# Patient Record
Sex: FEMALE | Race: WHITE | Employment: FULL TIME | URBAN - METROPOLITAN AREA
[De-identification: names, ages, dates, MRNs, and addresses within clinical notes are randomized per-mention and may not be internally consistent; named-entity substitution may affect disease eponyms.]

---

## 2019-06-04 ENCOUNTER — HOSPITAL ENCOUNTER (OUTPATIENT)
Dept: MAMMOGRAPHY | Age: 59
Discharge: HOME | End: 2019-06-04
Attending: OBSTETRICS & GYNECOLOGY
Payer: COMMERCIAL

## 2019-06-04 DIAGNOSIS — Z12.31 ENCOUNTER FOR SCREENING MAMMOGRAM FOR MALIGNANT NEOPLASM OF BREAST: ICD-10-CM

## 2019-06-04 PROCEDURE — 77067 SCR MAMMO BI INCL CAD: CPT | Performed by: OBSTETRICS & GYNECOLOGY

## 2020-12-13 ENCOUNTER — HOSPITAL ENCOUNTER (OUTPATIENT)
Dept: MAMMOGRAPHY | Age: 60
Discharge: HOME | End: 2020-12-13
Attending: OBSTETRICS & GYNECOLOGY
Payer: COMMERCIAL

## 2020-12-13 DIAGNOSIS — Z12.31 ENCOUNTER FOR SCREENING MAMMOGRAM FOR MALIGNANT NEOPLASM OF BREAST: ICD-10-CM

## 2020-12-13 PROCEDURE — 77063 BREAST TOMOSYNTHESIS BI: CPT | Performed by: OBSTETRICS & GYNECOLOGY

## 2020-12-13 PROCEDURE — 77067 SCR MAMMO BI INCL CAD: CPT | Performed by: OBSTETRICS & GYNECOLOGY

## 2021-08-31 PROCEDURE — 88342 IMHCHEM/IMCYTCHM 1ST ANTB: CPT | Performed by: OTOLARYNGOLOGY

## 2021-08-31 PROCEDURE — 88305 TISSUE EXAM BY PATHOLOGIST: CPT | Performed by: OTOLARYNGOLOGY

## 2021-08-31 PROCEDURE — 88341 IMHCHEM/IMCYTCHM EA ADD ANTB: CPT | Performed by: OTOLARYNGOLOGY

## 2021-10-29 ENCOUNTER — HOSPITAL ENCOUNTER (INPATIENT)
Facility: HOSPITAL | Age: 61
LOS: 2 days | Discharge: HOME | DRG: 281 | End: 2021-11-01
Attending: EMERGENCY MEDICINE | Admitting: HOSPITALIST
Payer: COMMERCIAL

## 2021-10-29 ENCOUNTER — APPOINTMENT (OUTPATIENT)
Dept: GENERAL RADIOLOGY | Age: 61
DRG: 281 | End: 2021-10-29
Attending: EMERGENCY MEDICINE
Payer: COMMERCIAL

## 2021-10-29 DIAGNOSIS — I20.0 UNSTABLE ANGINA (HCC): ICD-10-CM

## 2021-10-29 DIAGNOSIS — I21.4 NSTEMI (NON-ST ELEVATED MYOCARDIAL INFARCTION) (HCC): ICD-10-CM

## 2021-10-29 DIAGNOSIS — R79.89 AZOTEMIA: ICD-10-CM

## 2021-10-29 DIAGNOSIS — D72.829 LEUKOCYTOSIS, UNSPECIFIED TYPE: ICD-10-CM

## 2021-10-29 DIAGNOSIS — R77.8 ELEVATED TROPONIN: Primary | ICD-10-CM

## 2021-10-29 PROCEDURE — 71045 X-RAY EXAM CHEST 1 VIEW: CPT | Performed by: EMERGENCY MEDICINE

## 2021-10-29 RX ORDER — NITROGLYCERIN 20 MG/100ML
INJECTION INTRAVENOUS
Status: DISCONTINUED | OUTPATIENT
Start: 2021-10-29 | End: 2021-11-01

## 2021-10-29 RX ORDER — LIDOCAINE HYDROCHLORIDE 20 MG/ML
10 SOLUTION OROPHARYNGEAL ONCE
Status: COMPLETED | OUTPATIENT
Start: 2021-10-29 | End: 2021-10-29

## 2021-10-29 RX ORDER — MORPHINE SULFATE 4 MG/ML
2 INJECTION, SOLUTION INTRAMUSCULAR; INTRAVENOUS ONCE
Status: COMPLETED | OUTPATIENT
Start: 2021-10-29 | End: 2021-10-29

## 2021-10-29 RX ORDER — ASPIRIN 81 MG/1
324 TABLET, CHEWABLE ORAL ONCE
Status: DISCONTINUED | OUTPATIENT
Start: 2021-10-29 | End: 2021-11-01

## 2021-10-29 RX ORDER — HEPARIN SODIUM AND DEXTROSE 10000; 5 [USP'U]/100ML; G/100ML
12 INJECTION INTRAVENOUS ONCE
Status: COMPLETED | OUTPATIENT
Start: 2021-10-29 | End: 2021-10-30

## 2021-10-29 RX ORDER — MORPHINE SULFATE 4 MG/ML
2 INJECTION, SOLUTION INTRAMUSCULAR; INTRAVENOUS EVERY 30 MIN PRN
Status: DISCONTINUED | OUTPATIENT
Start: 2021-10-29 | End: 2021-11-01

## 2021-10-29 RX ORDER — HEPARIN SODIUM 5000 [USP'U]/ML
60 INJECTION INTRAVENOUS; SUBCUTANEOUS ONCE
Status: COMPLETED | OUTPATIENT
Start: 2021-10-29 | End: 2021-10-29

## 2021-10-29 RX ORDER — NITROGLYCERIN 0.4 MG/1
0.4 TABLET SUBLINGUAL ONCE
Status: COMPLETED | OUTPATIENT
Start: 2021-10-29 | End: 2021-10-29

## 2021-10-29 RX ORDER — MAGNESIUM HYDROXIDE/ALUMINUM HYDROXICE/SIMETHICONE 120; 1200; 1200 MG/30ML; MG/30ML; MG/30ML
30 SUSPENSION ORAL ONCE
Status: COMPLETED | OUTPATIENT
Start: 2021-10-29 | End: 2021-10-29

## 2021-10-29 RX ORDER — MORPHINE SULFATE 4 MG/ML
4 INJECTION, SOLUTION INTRAMUSCULAR; INTRAVENOUS EVERY 30 MIN PRN
Status: DISCONTINUED | OUTPATIENT
Start: 2021-10-29 | End: 2021-11-01

## 2021-10-30 ENCOUNTER — APPOINTMENT (OUTPATIENT)
Dept: CV DIAGNOSTICS | Facility: HOSPITAL | Age: 61
DRG: 281 | End: 2021-10-30
Attending: INTERNAL MEDICINE
Payer: COMMERCIAL

## 2021-10-30 ENCOUNTER — APPOINTMENT (OUTPATIENT)
Dept: INTERVENTIONAL RADIOLOGY/VASCULAR | Facility: HOSPITAL | Age: 61
DRG: 281 | End: 2021-10-30
Attending: INTERNAL MEDICINE
Payer: COMMERCIAL

## 2021-10-30 PROBLEM — I20.0 UNSTABLE ANGINA (HCC): Status: ACTIVE | Noted: 2021-10-30

## 2021-10-30 PROBLEM — D72.829 LEUKOCYTOSIS, UNSPECIFIED TYPE: Status: ACTIVE | Noted: 2021-10-30

## 2021-10-30 PROBLEM — I21.4 NSTEMI (NON-ST ELEVATED MYOCARDIAL INFARCTION) (HCC): Status: ACTIVE | Noted: 2021-10-30

## 2021-10-30 PROBLEM — R79.89 AZOTEMIA: Status: ACTIVE | Noted: 2021-10-30

## 2021-10-30 PROCEDURE — 93306 TTE W/DOPPLER COMPLETE: CPT | Performed by: INTERNAL MEDICINE

## 2021-10-30 PROCEDURE — 99223 1ST HOSP IP/OBS HIGH 75: CPT | Performed by: HOSPITALIST

## 2021-10-30 PROCEDURE — B2111ZZ FLUOROSCOPY OF MULTIPLE CORONARY ARTERIES USING LOW OSMOLAR CONTRAST: ICD-10-PCS | Performed by: INTERNAL MEDICINE

## 2021-10-30 PROCEDURE — B2151ZZ FLUOROSCOPY OF LEFT HEART USING LOW OSMOLAR CONTRAST: ICD-10-PCS | Performed by: INTERNAL MEDICINE

## 2021-10-30 RX ORDER — ASPIRIN 325 MG
325 TABLET, DELAYED RELEASE (ENTERIC COATED) ORAL ONCE
Status: COMPLETED | OUTPATIENT
Start: 2021-10-30 | End: 2021-10-30

## 2021-10-30 RX ORDER — ZINC SULFATE 50(220)MG
50 CAPSULE ORAL DAILY
COMMUNITY

## 2021-10-30 RX ORDER — LIDOCAINE HYDROCHLORIDE 10 MG/ML
INJECTION, SOLUTION EPIDURAL; INFILTRATION; INTRACAUDAL; PERINEURAL
Status: COMPLETED
Start: 2021-10-30 | End: 2021-10-30

## 2021-10-30 RX ORDER — TRAMADOL HYDROCHLORIDE 50 MG/1
50 TABLET ORAL EVERY 6 HOURS PRN
Status: DISCONTINUED | OUTPATIENT
Start: 2021-10-30 | End: 2021-11-01

## 2021-10-30 RX ORDER — SODIUM CHLORIDE 9 MG/ML
INJECTION, SOLUTION INTRAVENOUS CONTINUOUS
Status: ACTIVE | OUTPATIENT
Start: 2021-10-30 | End: 2021-10-30

## 2021-10-30 RX ORDER — ONDANSETRON 2 MG/ML
4 INJECTION INTRAMUSCULAR; INTRAVENOUS EVERY 6 HOURS PRN
Status: DISCONTINUED | OUTPATIENT
Start: 2021-10-30 | End: 2021-11-01

## 2021-10-30 RX ORDER — ASCORBIC ACID 500 MG
500 TABLET ORAL DAILY
COMMUNITY

## 2021-10-30 RX ORDER — MIDAZOLAM HYDROCHLORIDE 1 MG/ML
INJECTION INTRAMUSCULAR; INTRAVENOUS
Status: DISCONTINUED
Start: 2021-10-30 | End: 2021-10-30 | Stop reason: WASHOUT

## 2021-10-30 RX ORDER — MIDAZOLAM HYDROCHLORIDE 1 MG/ML
INJECTION INTRAMUSCULAR; INTRAVENOUS
Status: COMPLETED
Start: 2021-10-30 | End: 2021-10-30

## 2021-10-30 RX ORDER — ONDANSETRON 2 MG/ML
4 INJECTION INTRAMUSCULAR; INTRAVENOUS ONCE
Status: DISCONTINUED | OUTPATIENT
Start: 2021-10-30 | End: 2021-11-01

## 2021-10-30 RX ORDER — HEPARIN SODIUM AND DEXTROSE 10000; 5 [USP'U]/100ML; G/100ML
INJECTION INTRAVENOUS CONTINUOUS
Status: DISCONTINUED | OUTPATIENT
Start: 2021-10-30 | End: 2021-10-31 | Stop reason: ALTCHOICE

## 2021-10-30 RX ORDER — PROCHLORPERAZINE EDISYLATE 5 MG/ML
5 INJECTION INTRAMUSCULAR; INTRAVENOUS EVERY 8 HOURS PRN
Status: DISCONTINUED | OUTPATIENT
Start: 2021-10-30 | End: 2021-11-01

## 2021-10-30 RX ORDER — ATORVASTATIN CALCIUM 40 MG/1
40 TABLET, FILM COATED ORAL NIGHTLY
Status: DISCONTINUED | OUTPATIENT
Start: 2021-10-30 | End: 2021-11-01

## 2021-10-30 RX ORDER — ACETAMINOPHEN 325 MG/1
650 TABLET ORAL EVERY 6 HOURS PRN
Status: DISCONTINUED | OUTPATIENT
Start: 2021-10-30 | End: 2021-11-01

## 2021-10-30 RX ORDER — TRAMADOL HYDROCHLORIDE 50 MG/1
100 TABLET ORAL EVERY 6 HOURS PRN
Status: DISCONTINUED | OUTPATIENT
Start: 2021-10-30 | End: 2021-11-01

## 2021-10-30 RX ORDER — LISINOPRIL 5 MG/1
5 TABLET ORAL DAILY
Status: DISCONTINUED | OUTPATIENT
Start: 2021-10-30 | End: 2021-10-31

## 2021-10-30 RX ORDER — SODIUM CHLORIDE 9 MG/ML
INJECTION, SOLUTION INTRAVENOUS CONTINUOUS
Status: DISCONTINUED | OUTPATIENT
Start: 2021-10-30 | End: 2021-10-30

## 2021-10-30 RX ORDER — HEPARIN SODIUM 5000 [USP'U]/ML
INJECTION, SOLUTION INTRAVENOUS; SUBCUTANEOUS
Status: COMPLETED
Start: 2021-10-30 | End: 2021-10-30

## 2021-10-30 RX ORDER — ACETAMINOPHEN 325 MG/1
650 TABLET ORAL EVERY 4 HOURS PRN
Status: DISCONTINUED | OUTPATIENT
Start: 2021-10-30 | End: 2021-11-01

## 2021-10-30 RX ORDER — MORPHINE SULFATE 2 MG/ML
2 INJECTION, SOLUTION INTRAMUSCULAR; INTRAVENOUS ONCE
Status: COMPLETED | OUTPATIENT
Start: 2021-10-30 | End: 2021-10-30

## 2021-10-30 RX ORDER — SODIUM CHLORIDE 9 MG/ML
INJECTION, SOLUTION INTRAVENOUS
Status: DISCONTINUED | OUTPATIENT
Start: 2021-10-31 | End: 2021-10-30 | Stop reason: HOSPADM

## 2021-10-30 NOTE — PLAN OF CARE
Admitted pt from [address] [place] [address] [facility]/o mid chest pain 2/10 which is worse than before and L sided back pain 4/10 which pt reported that she developed while at [place]. Dr. Renetta Cerrato updated and notified of pt admission.  Dr. Chad Jones called to notify pt co,

## 2021-10-30 NOTE — PLAN OF CARE
Received patient at07:30 awake and alert. Chest discomfort 2 on pain scale. Heparin and Ntg infusing per protocol. EKG completed with Dr. Amber Harper at bedside. Orders for pre cath orders. Bilateral groins shaved and consent obtained.    Received patient f

## 2021-10-30 NOTE — PROCEDURES
Full procedure was dictated.     Procedure performed:   -Selective coronary angiography  -LV gram    Procedure indications:  -Acute non-ST elevation myocardial infarction  -Ongoing chest pain   -elevated blood pressure with no history of hypertension  -Fami 2021

## 2021-10-30 NOTE — ED PROVIDER NOTES
Patient Seen in: [facility] Emergency Department In [place]      History   Patient presents with:  Chest Pain Angina    Stated Complaint: chest pain, nausea onset 5pm    Subjective:   HPI    [address] is a pleasant 29-year-old female presented with chest pain appears no distress HEENT exam normal lungs normal cardiovascular exam normal abdomen normal extremities cyanosis or edema skin is nondiaphoretic patient is moving all extremities on neurologic exam       ED Course     Labs Reviewed   COMP METABOLIC PANEL with the patient, family, and clinical staff. MDM      Patient already received aspirin at home was given 2 rounds of nitroglycerin pills here in the emergency department sublingually with improvement minimal but still improvement of her pain.   P

## 2021-10-30 NOTE — H&P
[facility] HOSPITALIST  History and Physical     Buddy Goode Patient Status:  Inpatient    1960 MRN [id number]   [facility] 6NE-A Attending Delaney Mcconnell MD   Hosp Day # 0 PCP Jace Vargas MD     Chief Complaint: Chest pain    His UNITS Oral Tab, Take 1 tablet by mouth daily. , Disp: , Rfl:   B Complex Vitamins (VITAMIN B COMPLEX OR), Inject  as directed., Disp: , Rfl:   Nutritional Supplements (ESTROVEN) Oral Tab, Take  by mouth., Disp: , Rfl:         Review of Systems:   A comprehe results for input(s): CK in the last 168 hours. Inflammatory Markers  No results for input(s): CRP, ZIYAD, LDH, DDIMER in the last 168 hours. Imaging: Imaging data reviewed in Epic.       ASSESSMENT / PLAN:     #NSTEMI  -trend troponin  -cont nitroglyce

## 2021-10-31 PROCEDURE — 99232 SBSQ HOSP IP/OBS MODERATE 35: CPT | Performed by: HOSPITALIST

## 2021-10-31 RX ORDER — POTASSIUM CHLORIDE 20 MEQ/1
40 TABLET, EXTENDED RELEASE ORAL ONCE
Status: COMPLETED | OUTPATIENT
Start: 2021-10-31 | End: 2021-10-31

## 2021-10-31 RX ORDER — CARVEDILOL 6.25 MG/1
6.25 TABLET ORAL 2 TIMES DAILY WITH MEALS
Status: DISCONTINUED | OUTPATIENT
Start: 2021-10-31 | End: 2021-11-01

## 2021-10-31 RX ORDER — LISINOPRIL 2.5 MG/1
2.5 TABLET ORAL DAILY
Status: DISCONTINUED | OUTPATIENT
Start: 2021-10-31 | End: 2021-11-01

## 2021-10-31 NOTE — PROGRESS NOTES
[facility]     Hospitalist Progress Note     Carmencita Anthony Patient Status:  Inpatient    1960 MRN [id number]   [facility] 6NE-A Attending Barrett Sunshine MD   Hosp Day # 1 PCP Lawrence Orlando MD     Chief Complaint: Chest pain Epic.    Medications:   • lisinopril  2.5 mg Oral Daily   • carvedilol  6.25 mg Oral BID with meals   • rivaroxaban  15 mg Oral Daily with food   • atorvastatin  40 mg Oral Nightly   • ondansetron  4 mg Intravenous Once   • aspirin  324 mg Oral Once

## 2021-10-31 NOTE — PLAN OF CARE
Pt ambulated to bathroom around 8 pm last night\. Got fatigued and nauseous. 4 mg Zofran Iv given per pt request with relief and fell asleep. Hypotensive while sleeping, BP comes up when wakes up. Metoprolol held this am. Denies chest pain or SOB.  Will con

## 2021-10-31 NOTE — PLAN OF CARE
Received patient at 07:30 awake and alert. Complaints of stiffness in back. Up to chair/bathroom. Right groin site soft, no hematoma dressing removed. Continue plan of care. 1530 up ambulated hallway without difficulty.     Problem: CARDIOVASCULAR - A

## 2021-10-31 NOTE — PROGRESS NOTES
[facility]  Progress Note    Norberto Wsieman Patient Status:  Inpatient    1960 MRN [id number]   [facility] 6NE-A Attending Keerthi Arriola MD   Hosp Day # 1 PCP Philly Rodriguez MD     Assessment:  #Takotsubo cardiomyopathy with EF

## 2021-11-01 VITALS
OXYGEN SATURATION: 100 % | BODY MASS INDEX: 27.06 KG/M2 | DIASTOLIC BLOOD PRESSURE: 60 MMHG | RESPIRATION RATE: 16 BRPM | WEIGHT: 172.38 LBS | SYSTOLIC BLOOD PRESSURE: 121 MMHG | HEIGHT: 67 IN | TEMPERATURE: 99 F | HEART RATE: 77 BPM

## 2021-11-01 PROCEDURE — 99239 HOSP IP/OBS DSCHRG MGMT >30: CPT | Performed by: HOSPITALIST

## 2021-11-01 RX ORDER — ATORVASTATIN CALCIUM 20 MG/1
20 TABLET, FILM COATED ORAL NIGHTLY
Qty: 30 TABLET | Refills: 11 | Status: SHIPPED | OUTPATIENT
Start: 2021-11-01

## 2021-11-01 RX ORDER — LISINOPRIL 2.5 MG/1
2.5 TABLET ORAL DAILY
Qty: 30 TABLET | Refills: 11 | Status: SHIPPED | OUTPATIENT
Start: 2021-11-01

## 2021-11-01 RX ORDER — CARVEDILOL 6.25 MG/1
6.25 TABLET ORAL 2 TIMES DAILY WITH MEALS
Qty: 60 TABLET | Refills: 0 | Status: SHIPPED | OUTPATIENT
Start: 2021-11-01

## 2021-11-01 RX ORDER — ATORVASTATIN CALCIUM 20 MG/1
20 TABLET, FILM COATED ORAL NIGHTLY
Status: DISCONTINUED | OUTPATIENT
Start: 2021-11-01 | End: 2021-11-01

## 2021-11-01 NOTE — PLAN OF CARE
Assumed care of pt around 1930. Denies pain. SR-ST. HR 90's-low 100's. SBP low 100's. Pt refusing 2100 lisinopril because her SBP drops while she sleeps. VSS on RA. R groin C/D/I, soft, no hematoma. Walking the halls. Transfer orders in, awaiting bed.  Plan

## 2021-11-01 NOTE — PROGRESS NOTES
[facility]     Cardiology Progress Note        Francisco Guerra Patient Status:  Inpatient    1960 MRN [id number]   [facility] 6NE-A Attending Bruce Bloom MD   Hosp Day # 2 PCP Jaylene Villatoro MD       Subjective:  No chest further with dr Frye Records    Discussed plan of care with patient and nursing. GARTH Rodgers  11/1/2021  10:34 AM    I agree with above note and plan. I seen and examined the patient. The chart was reviewed.  at the bedside.     Patient affect BP. No need for diuretic. Xarelto 15 mg p.o. daily as initiated by Dr. Miguel Prabhakar with akinetic apex.   Most likely Xarelto will be discontinued in 6 to 8 weeks after echo is completed if there is improvement in LV systolic function and wall motion ab

## 2021-11-01 NOTE — PAYOR COMM NOTE
--------------  ADMISSION REVIEW     Payor: [address] [place]  Subscriber #:  [id number]  Authorization Number: [id number]     Admit date: 10/30/21  Admit time:  1:08 AM     10/29 Patient Seen in: [facility] Emergency Department In [facility] Result Value    Glucose 163 (*)     BUN 20 (*)     All other components within normal limits   TROPONIN I - Abnormal; Notable for the following components:    Troponin 1.120 (*)     All other components within normal limits   CBC W/ DIFFERENTIAL - Ab 145/84   Pulse 88   Temp 98 °F (36.7 °C) (Temporal)   Resp 18   Wt 167 lb 8.8 oz (76 kg)   SpO2 96%   BMI 27.88 kg/m²     Lab 10/29/21  2051   WBC 16.0*   HGB 14.1   MCV 91.9   .0     *   BUN 20*   CREATSERUM 0.77   GFRAA 97   GFRNAA 84   CA father).     Impression:  1. Non-STEMI. Troponin 1.12 in ER. 2.  Chest pain. 3.  Elevated blood pressure, no history of hypertension. 4.  Hyperglycemia. 5.  Family history positive for early CAD and thoracic aorta aneurysm (father).   6.  Spine arthri 1.7, then DC troponin tomorrow morning  -Start metoprolol tartrate 25 mg p.o. twice a day   -We will change to long-acting beta-blocker before discharge if short acting beta-blocker is tolerated since her blood pressure fluctuates  -Start lisinopril 5 mg p (last day)     Date/Time Temp Pulse Resp BP SpO2 Weight O2 Device O2 Flow Rate (L/min) [facility]    11/01/21 0800 — 80 15 129/70 100 % — None (Room air) — [place]    11/01/21 0700 — 63 14 122/61 98 % — — — [place]    11/01/21 0600 — 117 25 126/68 97 % — — — [place]    11/01/21 91/53 93 % — — —     10/31/21 0100 — 67 19 79/42 93 % — — —     10/31/21 0015 97.3 °F (36.3 °C) 75 19 92/54 94 % — — —     10/31/21 0006 — 89 27 87/53 94 % — — —     10/31/21 0003 — 78 11 73/43 95 % — — —     10/31/21 0000 — 73 11 70/41 93 % — —

## 2021-11-01 NOTE — PROGRESS NOTES
[facility]     Hospitalist Progress Note     Merle Shan Patient Status:  Inpatient    1960 MRN [id number]   [facility] 6NE-A Attending Lm Sousa MD   Hosp Day # 2 PCP Kathe Dunn MD     Chief Complaint: Chest pain results for input(s): CRP, ZIYAD, LDH, DDIMER in the last 168 hours. Imaging: Imaging data reviewed in Epic.     Medications:   • lisinopril  2.5 mg Oral Daily   • carvedilol  6.25 mg Oral BID with meals   • rivaroxaban  15 mg Oral Daily with food   • ator

## 2021-11-01 NOTE — PROCEDURES
[facility]    PATIENT'S NAME: [place], [place]   ATTENDING PHYSICIAN: Arabella Joe M.D. OPERATING PHYSICIAN: Echo Sandoval M.D.    PATIENT ACCOUNT#:   [de-identified]    LOCATION:  [address] [place] [address]  MEDICAL RECORD #:   [id number]       DATE OF BIRTH:  1 performed using 6-Turkish pigtail catheter. We measured pullback pressures across the aortic valve and pigtail catheter was removed.     Right femoral arterial sheathogram was performed and was acceptable for percutaneous closure using 6-Turkish Angio-Seal c valve.    PLAN:    1.   CCU monitoring. 2.   Medical management of stress cardiomyopathy. 3.   See postcardiac cath orders.   4.   Start metoprolol tartrate 25 mg p.o. twice a day and will change to long-acting beta-blocker before discharge if short-acti

## 2021-11-01 NOTE — PLAN OF CARE
Reviewed discharge instructions with follow up with patient.     Problem: CARDIOVASCULAR - ADULT  Goal: Maintains optimal cardiac output and hemodynamic stability  Description: INTERVENTIONS:  - Monitor vital signs, rhythm, and trends  - Monitor for bleedin

## 2021-11-02 NOTE — DISCHARGE SUMMARY
[facility] HOSPITALIST  DISCHARGE SUMMARY     Cristina Arciniega Patient Status:  Inpatient    1960 MRN [id number]   [facility] 6NE-A Attending No att. providers found   Hosp Day # 2 PCP Sj Brown MD     Date of Admission: 10/29/2021  Peter Quantity: 30 tablet  Refills: 11     carvedilol 6.25 MG Tabs  Commonly known as: COREG      Take 1 tablet (6.25 mg total) by mouth 2 (two) times daily with meals.    Quantity: 60 tablet  Refills: 0     lisinopril 2.5 MG Tabs      Take 1 tablet (2.5 mg total with Insurance for coverage                                      First visit 1 1/2 hours                                                                   Dress comfortably

## 2021-11-02 NOTE — PAYOR COMM NOTE
--------------  DISCHARGE REVIEW    Payor: [address] [facility]  Subscriber #:  [id number]  Authorization Number: [id number]     Admit date: 10/30/21  Admit time:   1:08 AM  Discharge Date: 11/1/2021  4:16 PM     Admitting Physician: Cathy Trevino MD discharged home in good condition    Lace+ Score: 43  59-90 High Risk  29-58 Medium Risk  0-28   Low Risk         TCM Follow-Up Recommendation:  LACE > 58:  High Risk of readmission after discharge from the hospital.    Procedures during hospitalization: Carlin London [phone], [facility] [address], [address] [facility]    Phone: [phone]   · atorvastatin 20 MG Tabs  · carvedilol 6.25 MG Tabs  · lisinopril 2.5 MG Tabs  · rivaroxaban 15 MG Tabs         [place]: [place]    Follow-up appoi extremities. Extremities: No edema.   -----------------------------------------------------------------------------------------------  PATIENT DISCHARGE INSTRUCTIONS: See electronic chart    Sigifredo Barrett MD    Time spent:  > 30 minutes    Electronically

## 2021-11-05 ENCOUNTER — TELEPHONE (OUTPATIENT)
Dept: OTHER | Facility: HOSPITAL | Age: 61
End: 2021-11-05

## 2021-11-05 NOTE — PROGRESS NOTES
[facility] Follow up Call  1. How are you doing now that you're home? Patient reports that she is doing well, getting stronger and feeling better each day. She denies any pain, bleeding or infection to the procedure site.     2. Since leaving the hospital, have

## 2021-11-15 ENCOUNTER — ORDER TRANSCRIPTION (OUTPATIENT)
Dept: CARDIAC REHAB | Facility: HOSPITAL | Age: 61
End: 2021-11-15

## 2021-11-15 DIAGNOSIS — I21.4 NSTEMI (NON-ST ELEVATED MYOCARDIAL INFARCTION) (HCC): Primary | ICD-10-CM

## 2021-11-16 ENCOUNTER — CARDPULM VISIT (OUTPATIENT)
Dept: CARDIAC REHAB | Facility: HOSPITAL | Age: 61
End: 2021-11-16
Attending: INTERNAL MEDICINE
Payer: COMMERCIAL

## 2021-11-16 VITALS — HEIGHT: 66 IN | WEIGHT: 172 LBS | BODY MASS INDEX: 27.64 KG/M2

## 2021-11-16 PROCEDURE — 93798 PHYS/QHP OP CAR RHAB W/ECG: CPT

## 2021-11-19 ENCOUNTER — APPOINTMENT (OUTPATIENT)
Dept: CARDIAC REHAB | Facility: HOSPITAL | Age: 61
End: 2021-11-19
Attending: INTERNAL MEDICINE
Payer: COMMERCIAL

## 2021-11-22 ENCOUNTER — CARDPULM VISIT (OUTPATIENT)
Dept: CARDIAC REHAB | Facility: HOSPITAL | Age: 61
End: 2021-11-22
Attending: INTERNAL MEDICINE
Payer: COMMERCIAL

## 2021-11-22 PROCEDURE — 93798 PHYS/QHP OP CAR RHAB W/ECG: CPT

## 2021-11-26 ENCOUNTER — APPOINTMENT (OUTPATIENT)
Dept: CARDIAC REHAB | Facility: HOSPITAL | Age: 61
End: 2021-11-26
Attending: INTERNAL MEDICINE
Payer: COMMERCIAL

## 2021-11-29 ENCOUNTER — CARDPULM VISIT (OUTPATIENT)
Dept: CARDIAC REHAB | Facility: HOSPITAL | Age: 61
End: 2021-11-29
Attending: INTERNAL MEDICINE
Payer: COMMERCIAL

## 2021-11-29 PROCEDURE — 93798 PHYS/QHP OP CAR RHAB W/ECG: CPT

## 2021-12-01 ENCOUNTER — APPOINTMENT (OUTPATIENT)
Dept: CARDIAC REHAB | Facility: HOSPITAL | Age: 61
End: 2021-12-01
Attending: INTERNAL MEDICINE
Payer: COMMERCIAL

## 2021-12-03 ENCOUNTER — APPOINTMENT (OUTPATIENT)
Dept: CARDIAC REHAB | Facility: HOSPITAL | Age: 61
End: 2021-12-03
Attending: INTERNAL MEDICINE
Payer: COMMERCIAL

## 2021-12-06 ENCOUNTER — CARDPULM VISIT (OUTPATIENT)
Dept: CARDIAC REHAB | Facility: HOSPITAL | Age: 61
End: 2021-12-06
Attending: INTERNAL MEDICINE
Payer: COMMERCIAL

## 2021-12-06 PROCEDURE — 93798 PHYS/QHP OP CAR RHAB W/ECG: CPT

## 2021-12-08 ENCOUNTER — APPOINTMENT (OUTPATIENT)
Dept: CARDIAC REHAB | Facility: HOSPITAL | Age: 61
End: 2021-12-08
Attending: INTERNAL MEDICINE
Payer: COMMERCIAL

## 2021-12-10 ENCOUNTER — CARDPULM VISIT (OUTPATIENT)
Dept: CARDIAC REHAB | Facility: HOSPITAL | Age: 61
End: 2021-12-10
Attending: INTERNAL MEDICINE
Payer: COMMERCIAL

## 2021-12-10 PROCEDURE — 93798 PHYS/QHP OP CAR RHAB W/ECG: CPT

## 2021-12-13 ENCOUNTER — CARDPULM VISIT (OUTPATIENT)
Dept: CARDIAC REHAB | Facility: HOSPITAL | Age: 61
End: 2021-12-13
Attending: INTERNAL MEDICINE
Payer: COMMERCIAL

## 2021-12-13 PROCEDURE — 93798 PHYS/QHP OP CAR RHAB W/ECG: CPT

## 2021-12-15 ENCOUNTER — APPOINTMENT (OUTPATIENT)
Dept: CARDIAC REHAB | Facility: HOSPITAL | Age: 61
End: 2021-12-15
Attending: INTERNAL MEDICINE
Payer: COMMERCIAL

## 2021-12-17 ENCOUNTER — APPOINTMENT (OUTPATIENT)
Dept: CARDIAC REHAB | Facility: HOSPITAL | Age: 61
End: 2021-12-17
Attending: INTERNAL MEDICINE
Payer: COMMERCIAL

## 2021-12-20 ENCOUNTER — APPOINTMENT (OUTPATIENT)
Dept: CARDIAC REHAB | Facility: HOSPITAL | Age: 61
End: 2021-12-20
Attending: INTERNAL MEDICINE
Payer: COMMERCIAL

## 2021-12-22 ENCOUNTER — APPOINTMENT (OUTPATIENT)
Dept: CARDIAC REHAB | Facility: HOSPITAL | Age: 61
End: 2021-12-22
Attending: INTERNAL MEDICINE
Payer: COMMERCIAL

## 2021-12-24 ENCOUNTER — APPOINTMENT (OUTPATIENT)
Dept: CARDIAC REHAB | Facility: HOSPITAL | Age: 61
End: 2021-12-24
Attending: INTERNAL MEDICINE
Payer: COMMERCIAL

## 2021-12-27 ENCOUNTER — APPOINTMENT (OUTPATIENT)
Dept: CARDIAC REHAB | Facility: HOSPITAL | Age: 61
End: 2021-12-27
Attending: INTERNAL MEDICINE
Payer: COMMERCIAL

## 2021-12-29 ENCOUNTER — APPOINTMENT (OUTPATIENT)
Dept: CARDIAC REHAB | Facility: HOSPITAL | Age: 61
End: 2021-12-29
Attending: INTERNAL MEDICINE
Payer: COMMERCIAL

## 2021-12-31 ENCOUNTER — APPOINTMENT (OUTPATIENT)
Dept: CARDIAC REHAB | Facility: HOSPITAL | Age: 61
End: 2021-12-31
Attending: INTERNAL MEDICINE
Payer: COMMERCIAL

## 2022-01-03 ENCOUNTER — APPOINTMENT (OUTPATIENT)
Dept: CARDIAC REHAB | Facility: HOSPITAL | Age: 62
End: 2022-01-03
Attending: INTERNAL MEDICINE
Payer: COMMERCIAL

## 2022-01-04 ENCOUNTER — HOSPITAL ENCOUNTER (OUTPATIENT)
Dept: MAMMOGRAPHY | Age: 62
Discharge: HOME | End: 2022-01-04
Attending: OBSTETRICS & GYNECOLOGY
Payer: COMMERCIAL

## 2022-01-04 DIAGNOSIS — Z12.31 VISIT FOR SCREENING MAMMOGRAM: ICD-10-CM

## 2022-01-05 ENCOUNTER — APPOINTMENT (OUTPATIENT)
Dept: CARDIAC REHAB | Facility: HOSPITAL | Age: 62
End: 2022-01-05
Attending: INTERNAL MEDICINE
Payer: COMMERCIAL

## 2022-01-07 ENCOUNTER — APPOINTMENT (OUTPATIENT)
Dept: CARDIAC REHAB | Facility: HOSPITAL | Age: 62
End: 2022-01-07
Attending: INTERNAL MEDICINE
Payer: COMMERCIAL

## 2022-01-10 ENCOUNTER — APPOINTMENT (OUTPATIENT)
Dept: CARDIAC REHAB | Facility: HOSPITAL | Age: 62
End: 2022-01-10
Attending: INTERNAL MEDICINE
Payer: COMMERCIAL

## 2022-01-12 ENCOUNTER — APPOINTMENT (OUTPATIENT)
Dept: CARDIAC REHAB | Facility: HOSPITAL | Age: 62
End: 2022-01-12
Attending: INTERNAL MEDICINE
Payer: COMMERCIAL

## 2022-01-14 ENCOUNTER — APPOINTMENT (OUTPATIENT)
Dept: CARDIAC REHAB | Facility: HOSPITAL | Age: 62
End: 2022-01-14
Attending: INTERNAL MEDICINE
Payer: COMMERCIAL

## 2022-01-17 ENCOUNTER — APPOINTMENT (OUTPATIENT)
Dept: CARDIAC REHAB | Facility: HOSPITAL | Age: 62
End: 2022-01-17
Attending: INTERNAL MEDICINE
Payer: COMMERCIAL

## 2022-01-19 ENCOUNTER — APPOINTMENT (OUTPATIENT)
Dept: CARDIAC REHAB | Facility: HOSPITAL | Age: 62
End: 2022-01-19
Attending: INTERNAL MEDICINE
Payer: COMMERCIAL

## 2022-01-21 ENCOUNTER — APPOINTMENT (OUTPATIENT)
Dept: CARDIAC REHAB | Facility: HOSPITAL | Age: 62
End: 2022-01-21
Attending: INTERNAL MEDICINE
Payer: COMMERCIAL

## 2022-01-24 ENCOUNTER — APPOINTMENT (OUTPATIENT)
Dept: CARDIAC REHAB | Facility: HOSPITAL | Age: 62
End: 2022-01-24
Attending: INTERNAL MEDICINE
Payer: COMMERCIAL

## 2022-01-26 ENCOUNTER — APPOINTMENT (OUTPATIENT)
Dept: CARDIAC REHAB | Facility: HOSPITAL | Age: 62
End: 2022-01-26
Attending: INTERNAL MEDICINE
Payer: COMMERCIAL

## 2022-01-27 ENCOUNTER — HOSPITAL ENCOUNTER (OUTPATIENT)
Dept: MAMMOGRAPHY | Age: 62
Discharge: HOME | End: 2022-01-27
Attending: OBSTETRICS & GYNECOLOGY
Payer: COMMERCIAL

## 2022-01-27 PROCEDURE — 77067 SCR MAMMO BI INCL CAD: CPT | Performed by: OBSTETRICS & GYNECOLOGY

## 2022-01-27 PROCEDURE — 77063 BREAST TOMOSYNTHESIS BI: CPT | Performed by: OBSTETRICS & GYNECOLOGY

## 2022-01-28 ENCOUNTER — APPOINTMENT (OUTPATIENT)
Dept: CARDIAC REHAB | Facility: HOSPITAL | Age: 62
End: 2022-01-28
Attending: INTERNAL MEDICINE
Payer: COMMERCIAL

## 2022-01-31 ENCOUNTER — APPOINTMENT (OUTPATIENT)
Dept: CARDIAC REHAB | Facility: HOSPITAL | Age: 62
End: 2022-01-31
Attending: INTERNAL MEDICINE
Payer: COMMERCIAL

## 2022-02-02 ENCOUNTER — APPOINTMENT (OUTPATIENT)
Dept: CARDIAC REHAB | Facility: HOSPITAL | Age: 62
End: 2022-02-02
Attending: INTERNAL MEDICINE
Payer: COMMERCIAL

## 2022-02-04 ENCOUNTER — APPOINTMENT (OUTPATIENT)
Dept: CARDIAC REHAB | Facility: HOSPITAL | Age: 62
End: 2022-02-04
Attending: INTERNAL MEDICINE
Payer: COMMERCIAL

## 2022-02-07 ENCOUNTER — APPOINTMENT (OUTPATIENT)
Dept: CARDIAC REHAB | Facility: HOSPITAL | Age: 62
End: 2022-02-07
Attending: INTERNAL MEDICINE
Payer: COMMERCIAL

## 2023-05-13 ENCOUNTER — HOSPITAL ENCOUNTER (OUTPATIENT)
Dept: MAMMOGRAPHY | Age: 63
Discharge: HOME | End: 2023-05-13
Attending: OBSTETRICS & GYNECOLOGY
Payer: COMMERCIAL

## 2023-05-13 DIAGNOSIS — Z12.31 BREAST CANCER SCREENING BY MAMMOGRAM: ICD-10-CM

## 2023-05-13 PROCEDURE — 77063 BREAST TOMOSYNTHESIS BI: CPT | Performed by: OBSTETRICS & GYNECOLOGY

## 2023-05-13 PROCEDURE — 77067 SCR MAMMO BI INCL CAD: CPT | Performed by: OBSTETRICS & GYNECOLOGY

## 2024-07-02 ENCOUNTER — OFFICE VISIT (OUTPATIENT)
Dept: INTERNAL MEDICINE CLINIC | Facility: CLINIC | Age: 64
End: 2024-07-02
Payer: COMMERCIAL

## 2024-07-02 VITALS
TEMPERATURE: 99 F | BODY MASS INDEX: 27.15 KG/M2 | DIASTOLIC BLOOD PRESSURE: 78 MMHG | OXYGEN SATURATION: 98 % | RESPIRATION RATE: 16 BRPM | SYSTOLIC BLOOD PRESSURE: 122 MMHG | WEIGHT: 173 LBS | HEART RATE: 76 BPM | HEIGHT: 67 IN

## 2024-07-02 DIAGNOSIS — R53.83 FATIGUE, UNSPECIFIED TYPE: ICD-10-CM

## 2024-07-02 DIAGNOSIS — Z13.220 SCREENING, LIPID: ICD-10-CM

## 2024-07-02 DIAGNOSIS — I51.81 TAKOTSUBO CARDIOMYOPATHY: ICD-10-CM

## 2024-07-02 DIAGNOSIS — E55.9 VITAMIN D DEFICIENCY: Primary | ICD-10-CM

## 2024-07-02 DIAGNOSIS — Z13.0 SCREENING FOR BLOOD DISEASE: ICD-10-CM

## 2024-07-02 DIAGNOSIS — Z80.0 FAMILY HISTORY OF STOMACH CANCER: ICD-10-CM

## 2024-07-02 DIAGNOSIS — G89.29 CHRONIC RIGHT-SIDED LOW BACK PAIN WITHOUT SCIATICA: ICD-10-CM

## 2024-07-02 DIAGNOSIS — Z12.11 SCREEN FOR COLON CANCER: ICD-10-CM

## 2024-07-02 DIAGNOSIS — Z13.29 SCREENING FOR THYROID DISORDER: ICD-10-CM

## 2024-07-02 DIAGNOSIS — M54.50 CHRONIC RIGHT-SIDED LOW BACK PAIN WITHOUT SCIATICA: ICD-10-CM

## 2024-07-02 PROCEDURE — 3078F DIAST BP <80 MM HG: CPT | Performed by: INTERNAL MEDICINE

## 2024-07-02 PROCEDURE — 3074F SYST BP LT 130 MM HG: CPT | Performed by: INTERNAL MEDICINE

## 2024-07-02 PROCEDURE — 3008F BODY MASS INDEX DOCD: CPT | Performed by: INTERNAL MEDICINE

## 2024-07-02 PROCEDURE — 99204 OFFICE O/P NEW MOD 45 MIN: CPT | Performed by: INTERNAL MEDICINE

## 2024-07-02 RX ORDER — METAXALONE 800 MG/1
400 TABLET ORAL 3 TIMES DAILY PRN
Qty: 30 TABLET | Refills: 0 | Status: SHIPPED | OUTPATIENT
Start: 2024-07-02

## 2024-07-02 NOTE — PROGRESS NOTES
Ashly De Dios  5/2/1960    Chief Complaint   Patient presents with    [facility]     TA RM18    Back Pain     Lower back pain       SUBJECTIVE   Ashly De Dios is a 64 year old female who presents to establish care. She has a PMHx  Vitamin D deficiency, history of Takotsubo cardiomyopathy.    Dr. [facility] for rosacea, etc.  Dr. Peters [facility] for history of Takotsubo  Dr. Giles  Ob/Gyne for pap     For seasonal allergies she takes Levocetirizine. Symptoms include itchy watery eyes.    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 Denies weakness of paresthesias in the legs.    Review of Systems   Review of Systems   No f/c/chest pain or sob. No cough. No abd pain/n/v/d. No ha or dizziness. No numbness, tingling, or weakness. No other complaints today.    OBJECTIVE:   /78   Pulse 76   Temp 98.7 °F (37.1 °C)   Resp 16   Ht 5' 7\" (1.702 m)   Wt 173 lb (78.5 kg)   SpO2 98%   BMI 27.10 kg/m²   Physical Exam   Constitutional: Oriented to person, place, and time. No distress.   Cardiovascular: Normal rate, regular rhythm and intact distal pulses.  No murmur, rubs or gallops.   Pulmonary/Chest: Effort normal and breath sounds normal. No respiratory distress.  Musculoskeletal: No tenderness of thoracic or lumbar spinous processes. No tenderness over trochanters/bursa.Straight leg raise did not produce paresthesias but she had pain in the low pain. Decreased ROM right hip.    Lab Results   Component Value Date     (H) 10/31/2021    BUN 16 10/31/2021    CREATSERUM 0.72 10/31/2021    BUNCREA NOT APPLICABLE 08/29/2016    ANIONGAP 3 10/31/2021    GFRAA 105 10/31/2021    GFRNAA 91 10/31/2021    CA 8.5 10/31/2021     10/31/2021    K 4.2 11/01/2021     10/31/2021    CO2 29.0 10/31/2021    OSMOCALC 292 10/31/2021      Lab Results   Component Value Date    WBC 9.0 10/31/2021    RBC  4.09 10/31/2021    HGB 12.8 10/31/2021    HCT 38.7 10/31/2021    MCV 94.6 10/31/2021    MCH 31.3 10/31/2021    MCHC 33.1 10/31/2021    RDW 12.4 10/31/2021    .0 10/31/2021      Lab Results   Component Value Date    T4F 1.3 08/29/2016    TSH 1.970 10/30/2021    TSHT4 1.25 11/07/2011        ASSESSMENT AND PLAN:       ICD-10-CM    1. Vitamin D deficiency  E55.9 VITAMIN D, 25-HYDROXY [49533][Q]      2. Fatigue, unspecified type  R53.83 CBC With Differential With Platelet     Comp Metabolic Panel     Lipid Panel     TSH W Reflex To Free T4     C-Reactive Protein [E]  Start with lab  work up and age-appropriate cancer screenings. Consider sleep study.      3. Chronic right-sided low back pain without sciatica  M54.50 Physical Therapy Referral - [place] Location    G89.29       4. Screen for colon cancer  Z12.11 GASTRO - INTERNAL      5. Family history of stomach cancer  Z80.0 GASTRO - INTERNAL  In grandmother and sister      6. Screening for thyroid disorder  Z13.29 TSH W Reflex To Free T4      7. Screening for blood disease  Z13.0 CBC With Differential With Platelet      8. Screening, lipid  Z13.220 Lipid Panel      9. Takotsubo cardiomyopathy  I51.81 Stable. Recovered EF. Monitoring per [facility].          The patient indicates understanding of these issues and agrees to the plan.  The patient is asked to return or present to the emergency room for worsening of symptoms.    TODAY'S ORDERS     No orders of the defined types were placed in this encounter.      Meds & Refills:  Requested Prescriptions      No prescriptions requested or ordered in this encounter       Imaging & Consults:  None    No follow-ups on file.  There are no Patient Instructions on file for this visit.    All questions were answered and the patient agrees with the plan.     Thank you,  Anika Benjamin, DO

## 2024-07-12 ENCOUNTER — PATIENT MESSAGE (OUTPATIENT)
Dept: INTERNAL MEDICINE CLINIC | Facility: CLINIC | Age: 64
End: 2024-07-12

## 2024-07-13 NOTE — TELEPHONE ENCOUNTER
From: Ashly De Dios  To: Anika Benjamin  Sent: 7/12/2024 2:29 PM CDT  Subject: Refill and PT    Can my skelaxin be sent to [facility] in [place] on [address] instead of [facility]     Can my order for PT be changed to AT d/t insurance   Thank you

## 2024-07-22 DIAGNOSIS — Z12.31 ENCOUNTER FOR SCREENING MAMMOGRAM FOR MALIGNANT NEOPLASM OF BREAST: Primary | ICD-10-CM

## 2024-07-22 LAB
ABSOLUTE BASOPHILS: 40 CELLS/UL (ref 0–200)
ABSOLUTE EOSINOPHILS: 134 CELLS/UL (ref 15–500)
ABSOLUTE LYMPHOCYTES: 2224 CELLS/UL (ref 850–3900)
ABSOLUTE MONOCYTES: 436 CELLS/UL (ref 200–950)
ABSOLUTE NEUTROPHILS: 3866 CELLS/UL (ref 1500–7800)
ALBUMIN/GLOBULIN RATIO: 1.6 (CALC) (ref 1–2.5)
ALBUMIN: 4.1 G/DL (ref 3.6–5.1)
ALKALINE PHOSPHATASE: 88 U/L (ref 37–153)
ALT: 20 U/L (ref 6–29)
AST: 18 U/L (ref 10–35)
BASOPHILS: 0.6 %
BILIRUBIN, TOTAL: 0.8 MG/DL (ref 0.2–1.2)
BUN: 21 MG/DL (ref 7–25)
C-REACTIVE PROTEIN: 5.4 MG/L
CALCIUM: 9.1 MG/DL (ref 8.6–10.4)
CARBON DIOXIDE: 27 MMOL/L (ref 20–32)
CHLORIDE: 104 MMOL/L (ref 98–110)
CHOL/HDLC RATIO: 2.7 (CALC)
CHOLESTEROL, TOTAL: 122 MG/DL
CREATININE: 0.67 MG/DL (ref 0.5–1.05)
EGFR: 98 ML/MIN/1.73M2
EOSINOPHILS: 2 %
GLOBULIN: 2.5 G/DL (CALC) (ref 1.9–3.7)
GLUCOSE: 89 MG/DL (ref 65–99)
HDL CHOLESTEROL: 46 MG/DL
HEMATOCRIT: 41.4 % (ref 35–45)
HEMOGLOBIN: 13.5 G/DL (ref 11.7–15.5)
LDL-CHOLESTEROL: 47 MG/DL (CALC)
LYMPHOCYTES: 33.2 %
MCH: 30.4 PG (ref 27–33)
MCHC: 32.6 G/DL (ref 32–36)
MCV: 93.2 FL (ref 80–100)
MONOCYTES: 6.5 %
MPV: 9.3 FL (ref 7.5–12.5)
NEUTROPHILS: 57.7 %
NON-HDL CHOLESTEROL: 76 MG/DL (CALC)
PLATELET COUNT: 218 THOUSAND/UL (ref 140–400)
POTASSIUM: 4.2 MMOL/L (ref 3.5–5.3)
PROTEIN, TOTAL: 6.6 G/DL (ref 6.1–8.1)
RDW: 13 % (ref 11–15)
RED BLOOD CELL COUNT: 4.44 MILLION/UL (ref 3.8–5.1)
SODIUM: 140 MMOL/L (ref 135–146)
TRIGLYCERIDES: 236 MG/DL
TSH W/REFLEX TO FT4: 1.14 MIU/L (ref 0.4–4.5)
VITAMIN D, 25-OH, TOTAL: 49 NG/ML (ref 30–100)
WHITE BLOOD CELL COUNT: 6.7 THOUSAND/UL (ref 3.8–10.8)

## 2024-09-13 ENCOUNTER — PATIENT MESSAGE (OUTPATIENT)
Dept: INTERNAL MEDICINE CLINIC | Facility: CLINIC | Age: 64
End: 2024-09-13

## 2024-09-13 DIAGNOSIS — M25.512 LEFT SHOULDER PAIN, UNSPECIFIED CHRONICITY: Primary | ICD-10-CM

## 2024-09-13 NOTE — TELEPHONE ENCOUNTER
From: Ashly De Dios  To: Anika Benjamin  Sent: 9/13/2024 8:33 AM CDT  Subject: Ortho for shoulder    Hx of bursitis  Having alot of shoulder pain  Having sleeping issues because of it  Would like a recommendation for an ortho

## 2024-09-13 NOTE — TELEPHONE ENCOUNTER
Okay to refer. Also please remind patient to schedule mammogram and colonoscopy.  If she does not want to do colonoscopy this year then please sent FIT card.

## 2024-09-19 ENCOUNTER — TELEPHONE (OUTPATIENT)
Dept: ORTHOPEDICS CLINIC | Facility: CLINIC | Age: 64
End: 2024-09-19

## 2024-09-19 DIAGNOSIS — M25.512 LEFT SHOULDER PAIN, UNSPECIFIED CHRONICITY: Primary | ICD-10-CM

## 2024-09-19 NOTE — TELEPHONE ENCOUNTER
Future Appointments   Date Time Provider Department Center   10/9/2024  1:30 PM Gaetano Chaparro DO [facility] [address]       This patient is coming for LT Shoulder bursitis, pain no ROM. No prior imaging done yet. Please advise if views are needed for this appt. Thanks.      Patient may be reached at [phone]

## 2024-09-20 ENCOUNTER — HOSPITAL ENCOUNTER (OUTPATIENT)
Dept: MAMMOGRAPHY | Age: 64
Discharge: HOME | End: 2024-09-20
Attending: INTERNAL MEDICINE
Payer: COMMERCIAL

## 2024-09-20 DIAGNOSIS — Z12.31 ENCOUNTER FOR SCREENING MAMMOGRAM FOR MALIGNANT NEOPLASM OF BREAST: ICD-10-CM

## 2024-09-20 PROCEDURE — 77067 SCR MAMMO BI INCL CAD: CPT | Performed by: INTERNAL MEDICINE

## 2024-09-20 PROCEDURE — 77063 BREAST TOMOSYNTHESIS BI: CPT | Performed by: INTERNAL MEDICINE

## 2024-09-23 ENCOUNTER — HOSPITAL ENCOUNTER (OUTPATIENT)
Dept: GENERAL RADIOLOGY | Age: 64
Discharge: HOME | End: 2024-09-23
Attending: FAMILY MEDICINE
Payer: COMMERCIAL

## 2024-09-23 DIAGNOSIS — M25.512 LEFT SHOULDER PAIN, UNSPECIFIED CHRONICITY: ICD-10-CM

## 2024-09-23 PROCEDURE — 73030 X-RAY EXAM OF SHOULDER: CPT | Performed by: FAMILY MEDICINE

## 2024-09-23 NOTE — TELEPHONE ENCOUNTER
Has appt set up already    Future Appointments   Date Time Provider Department Center   9/23/2024  3:00 PM PFS XR [facility]   10/9/2024  1:30 PM Gaetano Chaparro DO [facility] [address]

## 2024-10-02 ENCOUNTER — OFFICE VISIT (OUTPATIENT)
Facility: CLINIC | Age: 64
End: 2024-10-02
Payer: COMMERCIAL

## 2024-10-02 VITALS — HEIGHT: 67 IN | WEIGHT: 173 LBS | BODY MASS INDEX: 27.15 KG/M2

## 2024-10-02 DIAGNOSIS — M24.812 INTERNAL DERANGEMENT OF LEFT SHOULDER: Primary | ICD-10-CM

## 2024-10-02 DIAGNOSIS — M75.100 EXTERNAL ROTATION OF SHOULDER LAG SIGN: ICD-10-CM

## 2024-10-02 DIAGNOSIS — M75.02 ADHESIVE CAPSULITIS OF LEFT SHOULDER: ICD-10-CM

## 2024-10-02 PROCEDURE — 99204 OFFICE O/P NEW MOD 45 MIN: CPT | Performed by: FAMILY MEDICINE

## 2024-10-02 PROCEDURE — 3008F BODY MASS INDEX DOCD: CPT | Performed by: FAMILY MEDICINE

## 2024-10-02 RX ORDER — METHYLPREDNISOLONE 4 MG
TABLET, DOSE PACK ORAL
Qty: 1 EACH | Refills: 0 | Status: SHIPPED | OUTPATIENT
Start: 2024-10-02

## 2024-10-02 RX ORDER — ASPIRIN 81 MG/1
TABLET, CHEWABLE ORAL
COMMUNITY
Start: 2021-12-16

## 2024-10-02 NOTE — H&P
Sports Medicine Clinic Note    Subjective:    Chief Complaint: Left shoulder pain and stiffness.    History of Present Illness: This is a very pleasant 64 year old female patient who presents with a progressive decrease in left shoulder movement accompanied by pain that has worsened over the past several months. The patient works as a nurse and thinks the episode may have been triggered by a workplace injury about 5 months ago wherein she was helping transfer a patient and felt a sharp pull in the left shoulder. She did not formally report the injury to her employer. The patient reports difficulty with activities such as reaching overhead, behind the back, or performing tasks that require lifting the arm. Sleep disturbance due to shoulder pain when lying on the affected side is noted. No history of shoulder surgeries are reported. She has undergone a course of formal PT prescribed by her PCP with some initial improvement in pain and ROM but symptoms now seem to be worsening over the past several weeks.    Objective:    Left Shoulder Examination:    Inspection: No visible deformity or muscle atrophy. No erythema or swelling.  Palpation: Diffuse tenderness around the shoulder joint. No palpable warmth.  Range of Motion: Passive and active range of motion significantly limited in all directions; most notably, external rotation (10 degrees) and abduction (80 degrees). Internal rotation to belt line. Pain reported with attempted motion beyond the limited range.  Neurovascular: Grossly intact, no vascular abnormalities noted  Special Tests: No evidence of instability. Drop arm and external lag signs are present.    Diagnostic Tests:    X-rays of the affected shoulder were reviewed, showing no acute bony abnormalities or fractures. There is no dislocation. Mild glenohumeral and acromioclavicular DJD noted with overall preserved joint spaces.    Assessment:    Suspected adhesive capsulitis of the shoulder  Differential  Diagnosis: Rotator cuff pathology given history of trauma, osteoarthritis, subacromial bursitis    Plan:    Imaging: MRI of the left shoulder is recommended to rule out internal derangement given worsening symptoms despite trial of conservative management and history of trauma with drop arm/lag signs present.  Therapy: Can continue physical therapy focusing on gentle, progressive range of motion exercises. Include modalities for pain management as necessary.  Medications: Recommend NSAIDs for pain and inflammation control, steroid burst can be used for breakthrough pain until MRI is complete.  Procedures: Consider corticosteroid injection into the glenohumeral joint depending on MRI results.  Activity Recommendations: Encourage the patient to continue with daily activities within pain tolerance. Advise avoiding activities that significantly exacerbate pain.    Follow-Up: Tentatively scheduled for MRI review once complete. The patient to return earlier if symptoms significantly worsen or do not improve with current management.      Gaetano Chaparro DO, CAQSM   Primary Care Sports Medicine    Department of Orthopaedic Surgery  [facility]    [address] [place] [address]  [address] [place] [id number]    t: [phone]  f: [phone]      [facility]

## 2024-10-16 ENCOUNTER — OFFICE VISIT (OUTPATIENT)
Dept: INTERNAL MEDICINE CLINIC | Facility: CLINIC | Age: 64
End: 2024-10-16
Payer: COMMERCIAL

## 2024-10-16 VITALS
DIASTOLIC BLOOD PRESSURE: 64 MMHG | BODY MASS INDEX: 28.49 KG/M2 | WEIGHT: 171 LBS | HEIGHT: 65 IN | SYSTOLIC BLOOD PRESSURE: 100 MMHG | RESPIRATION RATE: 16 BRPM | TEMPERATURE: 98 F | HEART RATE: 83 BPM | OXYGEN SATURATION: 97 %

## 2024-10-16 DIAGNOSIS — R05.1 ACUTE COUGH: ICD-10-CM

## 2024-10-16 DIAGNOSIS — J04.0 LARYNGITIS: ICD-10-CM

## 2024-10-16 DIAGNOSIS — Z23 NEED FOR SHINGLES VACCINE: ICD-10-CM

## 2024-10-16 DIAGNOSIS — L71.9 ROSACEA: ICD-10-CM

## 2024-10-16 DIAGNOSIS — Z00.00 WELLNESS EXAMINATION: Primary | ICD-10-CM

## 2024-10-16 DIAGNOSIS — E78.1 HYPERTRIGLYCERIDEMIA: ICD-10-CM

## 2024-10-16 DIAGNOSIS — I51.81 TAKOTSUBO CARDIOMYOPATHY: ICD-10-CM

## 2024-10-16 PROBLEM — I20.0 UNSTABLE ANGINA (HCC): Status: RESOLVED | Noted: 2021-10-30 | Resolved: 2024-10-16

## 2024-10-16 PROBLEM — D72.829 LEUKOCYTOSIS, UNSPECIFIED TYPE: Status: RESOLVED | Noted: 2021-10-30 | Resolved: 2024-10-16

## 2024-10-16 PROBLEM — R79.89 ELEVATED TROPONIN: Status: RESOLVED | Noted: 2021-10-29 | Resolved: 2024-10-16

## 2024-10-16 PROBLEM — R79.89 AZOTEMIA: Status: RESOLVED | Noted: 2021-10-30 | Resolved: 2024-10-16

## 2024-10-16 PROCEDURE — 3074F SYST BP LT 130 MM HG: CPT | Performed by: INTERNAL MEDICINE

## 2024-10-16 PROCEDURE — 3078F DIAST BP <80 MM HG: CPT | Performed by: INTERNAL MEDICINE

## 2024-10-16 PROCEDURE — 99396 PREV VISIT EST AGE 40-64: CPT | Performed by: INTERNAL MEDICINE

## 2024-10-16 PROCEDURE — 3008F BODY MASS INDEX DOCD: CPT | Performed by: INTERNAL MEDICINE

## 2024-10-16 RX ORDER — ASPIRIN 81 MG/1
81 TABLET ORAL DAILY
COMMUNITY

## 2024-10-16 RX ORDER — METOPROLOL SUCCINATE 50 MG/1
50 TABLET, EXTENDED RELEASE ORAL DAILY
COMMUNITY

## 2024-10-16 RX ORDER — CODEINE PHOSPHATE/GUAIFENESIN 10-100MG/5
10 LIQUID (ML) ORAL EVERY 6 HOURS PRN
Qty: 180 ML | Refills: 0 | Status: SHIPPED | OUTPATIENT
Start: 2024-10-16 | End: 2024-10-30

## 2024-10-16 RX ORDER — VALACYCLOVIR HYDROCHLORIDE 1 G/1
1 TABLET, FILM COATED ORAL 2 TIMES DAILY
COMMUNITY
Start: 2023-10-04

## 2024-10-16 RX ORDER — LIFITEGRAST 50 MG/ML
SOLUTION/ DROPS OPHTHALMIC
COMMUNITY
Start: 2024-09-28

## 2024-10-16 NOTE — PROGRESS NOTES
\"Ashly De Dios  5/2/1960    Chief Complaint   Patient presents with    Physical     Pt still has L shoulder issue and will have MRI done tomorrow. Pt also has post nasal drip,  dry cough and laryngitis that started yesterday.   Pt had flu vaccine done thru work.  Would like shingrix vax.     SUBJECTIVE   Ashly De Dios is a 64 year old female who presents for complete physical examination. yesterday she developed URI symptoms including laryngitis. She denies fevers or chills. she coughs primarily in the evening  Which she attributes to postnasal drip. We discussed holding shingles vaccine until  she recovers from URI.      She developed significant myalgias so discontinued atorvastatin.  Shortly after discontinuing atorvastatin the myalgias resolved.  This may be why lipid panel demonstrates hypertriglyceridemia.    She has follow-up with cardiology soon for history of stable cardiomyopathy.    Review of Systems   Review of Systems   No f/c/chest pain or sob. No cough. No abd pain/n/v/d. No ha or dizziness. No numbness, tingling, or weakness. No other complaints today.    OBJECTIVE:   /64   Pulse 83   Temp 97.8 °F (36.6 °C) (Temporal)   Resp 16   Ht 5' 5\" (1.651 m)   Wt 171 lb (77.6 kg)   SpO2 97%   BMI 28.46 kg/m²   Physical Exam   Constitutional: Oriented to person, place, and time. No distress.   HEENT:  Normocephalic and atraumatic. Tympanic membranes appear normal.  Nose normal. Oropharynx is clear and moist.   Eyes: Conjunctivae wnl. PERRLA.  Neck: Normal range of motion. Neck supple.   Cardiovascular: Normal rate, regular rhythm and intact distal pulses.  No murmur, rubs or gallops.   Pulmonary/Chest: Effort normal and breath sounds normal. No respiratory distress.  Abdominal: Soft. Bowel sounds are present. Non tender, no masses, no organomegaly or hernias.  Musculoskeletal: No edema  in bilateral lower extremities.      Lymphadenopathy: No cervical adenopathy.   Neurological: No focal neurological  deficits. Cranial nerves II through XII are intact.  Skin: Skin is warm and dry. No rash on visualized skin.  Psychiatric: Normal mood and affect.     Lab Results   Component Value Date    GLU 89 07/18/2024    BUN 21 07/18/2024    CREATSERUM 0.67 07/18/2024    BUNCREA SEE NOTE: 07/18/2024    ANIONGAP 3 10/31/2021    GFRAA 105 10/31/2021    GFRNAA 91 10/31/2021    CA 9.1 07/18/2024     07/18/2024    K 4.2 07/18/2024     07/18/2024    CO2 27 07/18/2024    OSMOCALC 292 10/31/2021      Lab Results   Component Value Date    WBC 6.7 07/18/2024    RBC 4.44 07/18/2024    HGB 13.5 07/18/2024    HCT 41.4 07/18/2024    MCV 93.2 07/18/2024    MCH 30.4 07/18/2024    MCHC 32.6 07/18/2024    RDW 13.0 07/18/2024     07/18/2024      Lab Results   Component Value Date    T4F 1.3 08/29/2016    TSH 1.970 10/30/2021    TSHT4 1.14 07/18/2024        ASSESSMENT AND PLAN:   1. Wellness examination patient would like to schedule colonoscopy before the end of the year.  She sees gynecology for Pap smears and breast exams.    2. Takotsubo cardiomyopathy  Management per [facility] cardiology.  EF has recovered to baseline.Continue beta-blocker for now    3. Rosacea  Management per [facility]    4. Hypertriglyceridemia  Patient discontinued statin secondary to adverse medication side effect myalgias.  Lipid panel looks good.  LDL 47 which is well below goal.  Triglycerides 236.  Can continue to manage with lifestyle modifications.  Patient will discuss with her cardiologist.    5. Need for shingles vaccine  Decision was made to hold shingles vaccination due to acute viral URI.  Patient will call office to schedule when better.    6. Acute cough  - guaiFENesin-Codeine 100-10 MG/5ML Oral Syrup; Take 10 mL by mouth every 6 (six) hours as needed.  Dispense: 180 mL; Refill: 0    7. Laryngitis  - guaiFENesin-Codeine 100-10 MG/5ML Oral Syrup; Take 10 mL by mouth every 6 (six) hours as needed.  Dispense: 180 mL; Refill: 0          The patient indicates understanding of these issues and agrees to the plan.  The patient is asked to return or present to the emergency room for worsening of symptoms.    TODAY'S ORDERS     Orders Placed This Encounter   Procedures    Zoster Recombinant Adjuvanted (Shingrix -Shingles) [[id number]]       Meds & Refills:  Requested Prescriptions      No prescriptions requested or ordered in this encounter       Imaging & Consults:  ZOSTER VACC RECOMBINANT IM NJX    No follow-ups on file.  There are no Patient Instructions on file for this visit.    All questions were answered and the patient agrees with the plan.     Thank you,  Anika Benjamin, DO

## 2024-10-21 ENCOUNTER — PATIENT MESSAGE (OUTPATIENT)
Facility: CLINIC | Age: 64
End: 2024-10-21

## 2024-10-25 ENCOUNTER — TELEPHONE (OUTPATIENT)
Dept: ORTHOPEDICS CLINIC | Facility: CLINIC | Age: 64
End: 2024-10-25

## 2024-10-25 NOTE — TELEPHONE ENCOUNTER
10/24/24 5:45 pm Gaetano Chaparro DO [facility] Clinical Pool  Attached patient can be DB Monday for a left shoulder injection, will bring MRI disc to review but I've already called and reviewed the MRI read with her that was faxed.

## 2024-10-28 ENCOUNTER — OFFICE VISIT (OUTPATIENT)
Facility: CLINIC | Age: 64
End: 2024-10-28
Payer: COMMERCIAL

## 2024-10-28 VITALS — BODY MASS INDEX: 28.49 KG/M2 | HEIGHT: 65 IN | WEIGHT: 171 LBS

## 2024-10-28 DIAGNOSIS — M19.012 ARTHROSIS OF LEFT ACROMIOCLAVICULAR JOINT: ICD-10-CM

## 2024-10-28 DIAGNOSIS — M75.02 ADHESIVE CAPSULITIS OF LEFT SHOULDER: Primary | ICD-10-CM

## 2024-10-28 DIAGNOSIS — M75.102 ROTATOR CUFF SYNDROME OF LEFT SHOULDER: ICD-10-CM

## 2024-10-28 DIAGNOSIS — M65.912 TENOSYNOVITIS OF LEFT SHOULDER: ICD-10-CM

## 2024-10-28 PROCEDURE — 20606 DRAIN/INJ JOINT/BURSA W/US: CPT | Performed by: FAMILY MEDICINE

## 2024-10-28 PROCEDURE — 20611 DRAIN/INJ JOINT/BURSA W/US: CPT | Performed by: FAMILY MEDICINE

## 2024-10-28 PROCEDURE — 3008F BODY MASS INDEX DOCD: CPT | Performed by: FAMILY MEDICINE

## 2024-10-28 PROCEDURE — 99214 OFFICE O/P EST MOD 30 MIN: CPT | Performed by: FAMILY MEDICINE

## 2024-10-28 RX ORDER — KETOROLAC TROMETHAMINE 30 MG/ML
30 INJECTION, SOLUTION INTRAMUSCULAR; INTRAVENOUS ONCE
Status: COMPLETED | OUTPATIENT
Start: 2024-10-28 | End: 2024-10-29

## 2024-10-28 RX ORDER — TRIAMCINOLONE ACETONIDE 40 MG/ML
40 INJECTION, SUSPENSION INTRA-ARTICULAR; INTRAMUSCULAR ONCE
Status: COMPLETED | OUTPATIENT
Start: 2024-10-28 | End: 2024-10-29

## 2024-10-28 NOTE — PROGRESS NOTES
Sports Medicine Clinic Note    Subjective:    Chief Complaint: Persistent left shoulder pain and limited range of motion.    History: 64-year-old female returns with continued complaints of left shoulder stiffness and pain, impacting activities involving overhead motion, behind-the-back reaching, and lifting. She reports ongoing difficulty with sleep due to pain, especially when lying on the affected side. She has been following a physical therapy regimen with minimal improvement and notes that symptoms have continued to worsen. The MRI results have been reviewed with the patient during this visit.    Objective:    Left Shoulder Examination:    Inspection: No visible deformity or muscle atrophy noted. No erythema or swelling.  Palpation: Diffuse tenderness noted around the shoulder joint and over the acromioclavicular joint. No palpable warmth.  Range of Motion:  Active and passive range of motion remain significantly restricted.  External rotation is limited to 10 degrees.  Abduction limited to 80 degrees.  Internal rotation reaches only to the belt line.  Pain reported with attempts to exceed current motion limits.  Neurovascular: Sensation and vascular status grossly intact.  Special Tests: Positive external rotation lag and drop arm signs. No evidence of shoulder instability.    Diagnostic Tests:    MRI of the left shoulder reveals:  Thickening of the cortical humeral ligament, consistent with adhesive capsulitis.  Rotator cuff syndrome with thickening of the supraspinatus and infraspinatus tendons but no rotator cuff tear.  Long head biceps tenosynovitis present.  Degenerative changes with bone marrow edema observed in the acromioclavicular joint without signs of impingement.  No evidence of fracture.    Assessment:    Adhesive capsulitis of the left shoulder.  Rotator cuff syndrome with tendon thickening (supraspinatus and infraspinatus).  Long head biceps tenosynovitis.  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    Plan:    Procedures: Administer a corticosteroid injection to the left glenohumeral joint and acromioclavicular joint today to address pain and inflammation.  Therapy: Continue physical therapy with a focus on gentle, progressive range of motion exercises and pain management modalities, including heat and/or ultrasound as tolerated.  Medications: Continue NSAIDs for pain and inflammation control.  Activity Recommendations: Encourage daily activities within a tolerable range. Advise against lifting or overhead reaching that exacerbates pain. Avoid lying on the affected shoulder to reduce nighttime pain.    Follow-Up: Tentatively scheduled follow-up in 3-4 weeks to assess response to injection and therapy progress. Adjustments to the treatment plan will be made as clinically indicated. The patient is advised to return sooner if symptoms persist or worsen.    Ultrasound Guided Procedure Note (Multiple Procedures):    Procedure Details:  Procedures: Therapeutic injections into the left glenohumeral and acromioclavicular joints  Guidance Method: Ultrasound guidance    Pre-Procedure Evaluation: Discussed risks, benefits, and alternatives with the patient. The patient elected to proceed. Confirmed absence of prior adverse reactions, active infections, or relevant allergies. Pre-procedure skin assessment: No erythema, warmth, or abnormalities noted.     Preparation and Sterilization: Skin sterilized with ChloraPrep at the intended injection sites.    Procedure Techniques:  Needle Type: 22 gauge  Approach: Utilizing US for needle guidance and placement.  Injectate: A mixture of 1 mL of triamcinolone 40 mg/mL, 1 mL of Toradol 30 mg/mL and 1 mL of 1% Lidocaine without Epinephrine, administered at the glenohumeral joint site. A mixture of 1 mL of kenalog 40 mg/mL and 0.5 mL of 1% Lidocaine without Epinephrine, administered at the acromioclavicular joint site.   Completion: The  procedures were completed without complication; and occlusive bandages were applied post-injections.    Post-Procedure Instructions:   Advised the patient to avoid strenuous activity for 24-48 hours.  Pain management: Use ice or Tylenol as needed.  Monitoring: Patient instructed to observe for signs of infection or allergic reaction (e.g., fever, increased swelling, persistent pain) and to contact the clinic immediately if any such signs occur.    Follow-Up: Instructions: Rest the treated area for 2-3 days before resuming regular activities. The area may be more painful for the first 1-2 days post-procedure.    Complications: The patient tolerated the procedures well with no immediate complications noted.      Gaetano Chaparro DO, CAQSM   Primary Care Sports Medicine    Department of Orthopaedic Surgery  [facility]    [address] [place] [id number]  [address] [place] [address]    t: [phone]  f: [phone]      [email or web address]

## 2024-10-29 RX ADMIN — TRIAMCINOLONE ACETONIDE 40 MG: 40 INJECTION, SUSPENSION INTRA-ARTICULAR; INTRAMUSCULAR at 14:58:00

## 2024-10-29 RX ADMIN — KETOROLAC TROMETHAMINE 30 MG: 30 INJECTION, SOLUTION INTRAMUSCULAR; INTRAVENOUS at 14:58:00

## 2024-11-15 ENCOUNTER — OFFICE VISIT (OUTPATIENT)
Facility: CLINIC | Age: 64
End: 2024-11-15
Payer: COMMERCIAL

## 2024-11-15 VITALS — WEIGHT: 171 LBS | BODY MASS INDEX: 28.49 KG/M2 | HEIGHT: 65 IN

## 2024-11-15 DIAGNOSIS — M75.02 ADHESIVE CAPSULITIS OF LEFT SHOULDER: Primary | ICD-10-CM

## 2024-11-15 DIAGNOSIS — M65.912 TENOSYNOVITIS OF LEFT SHOULDER: ICD-10-CM

## 2024-11-15 DIAGNOSIS — M75.102 ROTATOR CUFF SYNDROME OF LEFT SHOULDER: ICD-10-CM

## 2024-11-15 DIAGNOSIS — M19.012 ARTHROSIS OF LEFT ACROMIOCLAVICULAR JOINT: ICD-10-CM

## 2024-11-15 DIAGNOSIS — M75.22 TENDINITIS OF LONG HEAD OF BICEPS BRACHII OF LEFT SHOULDER: ICD-10-CM

## 2024-11-15 RX ORDER — KETOROLAC TROMETHAMINE 30 MG/ML
30 INJECTION, SOLUTION INTRAMUSCULAR; INTRAVENOUS ONCE
Status: COMPLETED | OUTPATIENT
Start: 2024-11-15 | End: 2024-11-15

## 2024-11-15 RX ORDER — BETAMETHASONE SODIUM PHOSPHATE AND BETAMETHASONE ACETATE 3; 3 MG/ML; MG/ML
6 INJECTION, SUSPENSION INTRA-ARTICULAR; INTRALESIONAL; INTRAMUSCULAR; SOFT TISSUE ONCE
Status: COMPLETED | OUTPATIENT
Start: 2024-11-15 | End: 2024-11-15

## 2024-11-15 RX ADMIN — KETOROLAC TROMETHAMINE 30 MG: 30 INJECTION, SOLUTION INTRAMUSCULAR; INTRAVENOUS at 10:54:00

## 2024-11-15 RX ADMIN — BETAMETHASONE SODIUM PHOSPHATE AND BETAMETHASONE ACETATE 6 MG: 3; 3 INJECTION, SUSPENSION INTRA-ARTICULAR; INTRALESIONAL; INTRAMUSCULAR; SOFT TISSUE at 10:54:00

## 2024-11-15 NOTE — PROGRESS NOTES
Sports Medicine Clinic Note    Subjective:    Chief Complaint: Persistent anterior left shoulder pain with stiffness.    History: The patient, a 64-year-old female, reports significant improvement in left shoulder pain since her last visit, though anterior shoulder discomfort persists, particularly with certain activities such as lifting or overhead motion. She describes the pain as consistent with prior episodes but less intense overall. She continues to experience stiffness but notes some improvement in range of motion with daily use. Sleep quality has improved, and she denies new or worsening symptoms.    Objective:    Left Shoulder Examination:    Inspection: No visible deformity or muscle atrophy. No swelling or erythema observed.  Palpation: Mild tenderness over the long head of the biceps tendon. No tenderness over the acromioclavicular joint or surrounding musculature.  Range of Motion:  Active and passive range of motion show minimal improvement.  External rotation: 30 degrees with mild discomfort at end range.  Abduction: 100 degrees with stiffness noted.  Internal rotation: Reaches to the waistline.  Neurovascular: Sensation and vascular status grossly intact.    Diagnostic Tests:    MRI findings from the prior visit confirm long head biceps tenosynovitis, adhesive capsulitis, and degenerative changes of the acromioclavicular joint.    Assessment:    Long head biceps tenosynovitis.  Adhesive capsulitis of the left shoulder.  Degenerative joint disease of the acromioclavicular joint.  Rotator cuff syndrome with prior tendon thickening.    Plan:    Procedures: Administered a corticosteroid injection to the left long head of the biceps tendon under ultrasound guidance during this visit to address residual inflammation.  Therapy: Begin formal physical therapy with a focus on improving range of motion, particularly external rotation and abduction, and strengthening exercises as tolerated.  Medications:  Continue NSAIDs as needed for pain and inflammation management.  Activity Recommendations: Limit activities requiring overhead lifting or repetitive motions that exacerbate discomfort. Encourage regular use of the shoulder within pain-free limits to maintain motion gains.    Follow-Up: Tentatively schedule follow-up in 4-6 weeks to reassess progress with physical therapy and response to the injection. Patient advised to return sooner if symptoms worsen or fail to improve.    Ultrasound Guided Procedure Note:    After discussion of the risks and benefits, the patient elected to proceed with an ultrasound guided injection into the long head bicep tenosynovial sheath within the bicipital groove, left side. Confirmed that the patient does not have history of prior adverse reactions, active infections, or relevant allergies. There was no erythema or warmth, and the skin was clear.     The skin was sterilized with ChloraPrep. A 25 gauge needle was inserted via inferolateral approach utilizing US for needle guidance and placement. The site was injected with a mixture of 1 mL of betamethasone 6 mg/mL, 1 mL of ketorolac 30 mg/mL and 1 mL of 1% Lidocaine without Epinephrine. The injection was completed without complication, and a bandage was applied. The patient tolerated the procedure well and was instructed to avoid strenuous activity for the next 24-48 hours and to use ice or Tylenol for pain as needed. The patient will call immediately with any signs of infection or allergic reaction.    Post-Injection Care: The patient tolerated the procedure well. An occlusive bandage was placed over the injection site. Post-injection care instructions provided to the patient. The patient was asked to avoid strenuous activity and continue to rest the area for 2-3 days before resuming regular activities. Patient advised that the area may be more painful for the first 1-2 days. They can use ice or Tylenol for pain as needed.  Patient was  instructed to watch for fever, increased swelling, or persistent pain. The patient will call immediately with any signs of infection or allergic reaction.      Gaetano Chaparro DO, CAQSM   Primary Care Sports Medicine

## 2024-11-19 ENCOUNTER — TELEPHONE (OUTPATIENT)
Dept: PHYSICAL THERAPY | Facility: HOSPITAL | Age: 64
End: 2024-11-19

## 2024-11-20 ENCOUNTER — TELEPHONE (OUTPATIENT)
Dept: PHYSICAL THERAPY | Facility: HOSPITAL | Age: 64
End: 2024-11-20

## 2024-11-21 ENCOUNTER — OFFICE VISIT (OUTPATIENT)
Facility: LOCATION | Age: 64
End: 2024-11-21
Attending: FAMILY MEDICINE
Payer: COMMERCIAL

## 2024-11-21 DIAGNOSIS — M65.912 TENOSYNOVITIS OF LEFT SHOULDER: ICD-10-CM

## 2024-11-21 DIAGNOSIS — M75.02 ADHESIVE CAPSULITIS OF LEFT SHOULDER: Primary | ICD-10-CM

## 2024-11-21 DIAGNOSIS — M75.102 ROTATOR CUFF SYNDROME OF LEFT SHOULDER: ICD-10-CM

## 2024-11-21 DIAGNOSIS — M19.012 ARTHROSIS OF LEFT ACROMIOCLAVICULAR JOINT: ICD-10-CM

## 2024-11-21 DIAGNOSIS — M75.22 TENDINITIS OF LONG HEAD OF BICEPS BRACHII OF LEFT SHOULDER: ICD-10-CM

## 2024-11-21 PROCEDURE — 97161 PT EVAL LOW COMPLEX 20 MIN: CPT

## 2024-11-21 PROCEDURE — 97110 THERAPEUTIC EXERCISES: CPT

## 2024-11-21 NOTE — PROGRESS NOTES
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 Was in therapy and progressing well until a patient fell on her shoulder which made it much worse. She reports developing frozen shoulder. She has had multiple injections, last one being about a week ago. This has helped to a certain degree with less reported pain. Denies any current neck pain or paresthesia. She did have some paresthesia prior to injections. Daily function has been limited due to pain and ROM restrictions. She is (R) handed. Works as a nurse in a clinic. Denies any previous shoulder issues prior. Aggravating factors include much of the normal daily activities such as dressing, bathing, reaching, carrying even lighter items, etc. Rest and avoiding pain provoking positions for relief          Pt describes pain level current 0/10, at best 0/10, at worst 5/10.  Prior to injection - 8/10  Current functional limitations include ADLs, cleaning/cooking, household activities, sleeping, reaching in multiple directions, reaching overhead, carrying/lifting things.     Ashly describes prior level of function as painful and restricted. Pt goals include decrease pain, improved shoulder ROM, ability to perform daily activities with more ease and less pain.  Past medical history was reviewed with Ashly. Significant findings include  has a past medical history of Bronchitis, not specified as acute or chronic, Congestive heart disease (HCC)  (10/30/2021), Counseling on substance use and abuse, Heart attack (HCC), High cholesterol, and Other, multiple and ill-defined closed fractures of lower limb.      ASSESSMENT  Ashly presents to physical therapy evaluation with primary c/o (L) shoulder pain & dysfunction. The results of the objective tests and measures show decreased (L) shoulder ROM, upper quarter flexibility deficits, postural dysfunction, decreased shoulder strength, shoulder mobility restrictions.  Functional deficits include but are not limited to performance of ADLs, household activities, reaching overhead, reaching in multiple directions, sleeping, carrying and/or lifting things.  Signs and symptoms are consistent with diagnosis of rotator cuff tendinitis & adhesive capsulitis. Pt and PT discussed evaluation findings, pathology, POC and HEP.  Pt voiced understanding and performs HEP correctly without reported pain. Skilled Physical Therapy is medically necessary to address the above impairments and reach functional goals.     OBJECTIVE:   Observation/Posture: FHP, rounded and forward shoulders     Cervical Screen: WFL - no pain provocation noted    (* denotes performed with pain)  AROM &/or MMT  (Scale 0-5): Degrees (R) Degrees (L) MMT (R) MMT (L)   Shoulder Flex 150 105* 5 4-*   Shoulder Abd 155 100* 5 4-*   Shoulder Ext 45 35* 5 4*   IR @ 90 Deg Abd   5 5   ER @ 90 Deg Abd   5 4   Functional Reach (R) (L)     HBB/IR T10 (L) glut     HBH/ER T3 unable         Flexibility: decreased flexibility pectorals, UT/LS, posterior shoulder     Palpation:   +TTP & tightness noted to pectorals, subscapularis, teres major, lat, posterior shoulder musculature     Accessory motion:   Mod to significant decrease (L) GH joint inferior & posterior glide (hyperirritability noted with testing     Today’s Treatment and Response:   Pt education was provided on exam findings, treatment diagnosis, treatment plan, expectations, and prognosis. Pt was also provided  recommendations for activity modifications, possible soreness after evaluation, modalities as needed [ice/heat], postural corrections, pain science education , detrimental fear avoidance behaviors, and importance of remaining active. Pt education regarding potential shoulder pathology, functional anatomy and biomechanics/pathomechanics of the shoulder complex, avoidance of activities that cause increased shoulder irritation, adherence to HEP for optimal outcomes, postural re-education       Patient was instructed in and issued a HEP for:   Supine dowel press, Supine dowel flexion, [place] pulley scaption & flexion, Supine dowel ER      Charges: PT Eval Low Complexity, TherEx 2      Total Timed Treatment: 25 min     Total Treatment Time: 45 min     Based on clinical rationale and outcome measures, this evaluation involved Low Complexity decision making due to 1-2 personal factors/comorbidities, 3 body structures involved/activity limitations, and evolving symptoms including changing pain levels.    PLAN OF CARE:    Goals: (to be met in 12 visits)   Pt will report improved ability to sleep without waking due to shoulder pain   Pt will improve (L) shoulder flexion AAROM to >150 degrees in order to work towards improve ROM/mobility needed for overhead activity   Pt will improve (L) shoulder abduction and/or scaption AAROM to >150 degrees to work towards improving ROM/mobility needed for overhead activity   Pt will improve (L) shoulder AROM flexion and abduction to 145 degrees or greater to improve ability to reach to higher surfaces  Pt will demonstrate HBB reach to approximately upper lumbar area or higher in order to work towards ability to wash back or perform other ADLs  Pt will improve shoulder strength throughout to 4/5 to improve function with carrying/lifting items to different level surfaces  Pt will report being able to don/doff shirts, bra, etc with less restriction and pain   Pt will be independent and compliant  with comprehensive HEP to maintain progress achieved in PT      Frequency / Duration: Patient will be seen for 2 x/week or a total of 12 visits over a 90 day period. Treatment will include: Manual Therapy, Neuromuscular Re-education, Self-Care Home Management, Therapeutic Activities, Therapeutic Exercise, Home Exercise Program instruction, and Modalities as necessary    Education or treatment limitation: None    Rehab Potential:good    QuickDASH Outcome Score  Score: (Patient-Rptd) 36.36 % (11/20/2024  2:58 PM)      Patient/Family/Caregiver was advised of these findings, precautions, and treatment options and has agreed to actively participate in planning and for this course of care.    Thank you for your referral. Please co-sign or sign and return this letter via fax as soon as possible to [phone]. If you have any questions, please contact me at Dept: [phone]    Sincerely,  Electronically signed by therapist: Kwame Mendez, PT  Physician's certification required: Yes  I certify the need for these services furnished under this plan of treatment and while under my care.    X___________________________________________________ Date____________________    Certification From: 11/21/2024  To:2/19/2025

## 2024-11-22 ENCOUNTER — TELEPHONE (OUTPATIENT)
Dept: PHYSICAL THERAPY | Facility: HOSPITAL | Age: 64
End: 2024-11-22

## 2024-11-25 ENCOUNTER — TELEPHONE (OUTPATIENT)
Facility: LOCATION | Age: 64
End: 2024-11-25

## 2024-11-29 ENCOUNTER — OFFICE VISIT (OUTPATIENT)
Facility: LOCATION | Age: 64
End: 2024-11-29
Attending: FAMILY MEDICINE
Payer: COMMERCIAL

## 2024-11-29 PROCEDURE — 97110 THERAPEUTIC EXERCISES: CPT

## 2024-11-29 PROCEDURE — 97140 MANUAL THERAPY 1/> REGIONS: CPT

## 2024-11-29 NOTE — PROGRESS NOTES
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 Had a busy week. Feels that she can get her arm higher with the pulley    Pain: 0/10 currently at rest; lots of stiffness with elevation      Objective:   See flowsheet      Assessment:   Pt demonstrates decreased (L) GH joint inferior & posterior glide. Guarding & associated tenderness noted with shoulder complex musculature including shoulder internal & external rotators  Demonstrated improved AAROM post treatment. Still very limited in A/AA/PROM in all planes      Goals:   Pt will report improved ability to sleep without waking due to shoulder pain   Pt will improve (L) shoulder flexion AAROM to >150 degrees in order to work towards improve ROM/mobility needed for overhead activity   Pt will improve (L) shoulder abduction and/or scaption AAROM to >150 degrees to work towards improving ROM/mobility needed for overhead activity   Pt will improve (L) shoulder AROM flexion and abduction to 145 degrees or greater to improve ability to reach to higher surfaces  Pt will demonstrate HBB reach to approximately upper lumbar area or higher in order to work towards ability to wash back or perform other ADLs  Pt will improve shoulder strength throughout to 4/5 to improve function with carrying/lifting items to different level surfaces  Pt will report being able to don/doff shirts, bra, etc with less restriction and pain   Pt will be independent and compliant with comprehensive HEP to  maintain progress achieved in PT     Plan: Patient will be seen for 2 x/week or a total of 12 visits  Decrease shoulder irritability/pain via gentle soft tissue treatment, GH joint mobilization, gentle stretching  Date: 11/29/2024  TX#: 2/12 Date:                 TX#: 3/ Date:                 TX#: 4/ Date:                 TX#: 5/ Date:   Tx#: 6/   TherEx (30 min)       UBE x 6 min (L1)       H/L dowel shoulder press 2 x 10       H/L dowel shoulder flexion 2 x 10        H/L dowel shoulder ER 2 x 10       OH pulley flexion/scaption 2 x 10 (5 second hold)       PROM (L) shoulder as tolerated       Manual Therapy (15 min)       STM/MFR (L) subscapularis, teres major, lat, infraspinatus        (L) GH joint LAD, inferior glide, posterior glide, grade 3         HEP:   Supine dowel press, Supine dowel flexion, OH pulley scaption & flexion, Supine dowel ER    Charges: Manual 1; TherEx 2       Total Timed Treatment: 45 min  Total Treatment Time: 45 min

## 2024-12-02 ENCOUNTER — TELEPHONE (OUTPATIENT)
Dept: PHYSICAL THERAPY | Facility: HOSPITAL | Age: 64
End: 2024-12-02

## 2024-12-04 ENCOUNTER — APPOINTMENT (OUTPATIENT)
Facility: LOCATION | Age: 64
End: 2024-12-04
Attending: FAMILY MEDICINE
Payer: COMMERCIAL

## 2024-12-06 ENCOUNTER — OFFICE VISIT (OUTPATIENT)
Facility: LOCATION | Age: 64
End: 2024-12-06
Attending: FAMILY MEDICINE
Payer: COMMERCIAL

## 2024-12-06 PROCEDURE — 97110 THERAPEUTIC EXERCISES: CPT

## 2024-12-06 PROCEDURE — 97140 MANUAL THERAPY 1/> REGIONS: CPT

## 2024-12-06 NOTE — PROGRESS NOTES
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 Exercises are slowly helping    Pain: 0/10 (just stiffness)      Objective:   See flowsheet  Decreased (L) GH joint inferior & posterior glide  Hypertonicity noted posterior shoulder musculature, internal rotators (subscapularis, teres major, lat)      Assessment:   Pt with fair tolerance to treatment. Demonstrates soft tissue & capsular restrictions.   Slow but progressive improvement in AA/PROM. Cueing needed for proper form and positioning with exercises       Goals:   Pt will report improved ability to sleep without waking due to shoulder pain   Pt will improve (L) shoulder flexion AAROM to >150 degrees in order to work towards improve ROM/mobility needed for overhead activity   Pt will improve (L) shoulder abduction and/or scaption AAROM to >150 degrees to work towards improving ROM/mobility needed for overhead activity   Pt will improve (L) shoulder AROM flexion and abduction to 145 degrees or greater to improve ability to reach to higher surfaces  Pt will demonstrate HBB reach to approximately upper lumbar area or higher in order to work towards ability to wash back or perform other ADLs  Pt will improve shoulder strength throughout to 4/5 to improve function with carrying/lifting items to different level surfaces  Pt will report being able to don/doff shirts, bra, etc with less restriction and pain   Pt will be independent and compliant with comprehensive HEP to  maintain progress achieved in PT     Plan: Patient will be seen for 2 x/week or a total of 12 visits  Decrease shoulder irritability/pain via gentle soft tissue treatment, GH joint mobilization, gentle stretching  Date: 11/29/2024  TX#: 2/12 Date: 12/6/24                TX#: 3/12 Date:                 TX#: 4/ Date:                 TX#: 5/ Date:   Tx#: 6/   TherEx (30 min) TherEx (30 min)      UBE x 6 min (L1) UBE x 6 min (L1)      H/L dowel shoulder press 2 x 10 H/L dowel shoulder flexion 2 x 10 (5 second hold)      H/L dowel shoulder flexion 2 x 10  H/L dowel shoulder ER 2 x 10 (5 second hold)      H/L dowel shoulder ER 2 x 10 OH pulley flexion 2 x 10 (5 second hold)      OH pulley flexion/scaption 2 x 10 (5 second hold) Standing dowel shoulder extension 2 x 10   Attempted standing dowel shoulder extension & adduction x 10 (limited movement)      PROM (L) shoulder as tolerated PROM/gentle stretching (L) shoulder as tolerated       Manual Therapy (15 min) Manual Therapy (15 min)      STM/MFR (L) subscapularis, teres major, lat, infraspinatus  (L) GH joint LAD, grade 3  (L) GH joint inferior & posterior glides, grade 3      (L) GH joint LAD, inferior glide, posterior glide, grade 3 STM/MFR infraspinatus, subscapularis, lat, teres major        HEP:   Supine dowel press, Supine dowel flexion, OH pulley scaption & flexion, Supine dowel ER    Charges: Manual 1; TherEx 2      Total Timed Treatment: 45 min  Total Treatment Time: 45 min

## 2024-12-11 ENCOUNTER — OFFICE VISIT (OUTPATIENT)
Facility: LOCATION | Age: 64
End: 2024-12-11
Attending: FAMILY MEDICINE
Payer: COMMERCIAL

## 2024-12-11 PROCEDURE — 97110 THERAPEUTIC EXERCISES: CPT

## 2024-12-11 PROCEDURE — 97140 MANUAL THERAPY 1/> REGIONS: CPT

## 2024-12-11 NOTE — PROGRESS NOTES
Diagnosis:   Adhesive capsulitis of left shoulder (M75.02)  Tenosynovitis of left shoulder (M65.912)    Arthrosis of left acromioclavicular joint (M19.012)  Rotator cuff syndrome of left shoulder (M75.102)    Tendinitis of long head of biceps brachii of left shoulder (M75.22)      Referring Provider: Gaetano Chaparro  Date of Evaluation:    11/21/24    Precautions:  None Next MD visit:     Date of Surgery: n/a   Insurance Primary/Secondary: BCBS POS / N/A     # Auth Visits: 12            Subjective:   The patient reports a few instances of worse pain in the shoulder with moving the arm certain ways    Pain: 0/10 (just stiffness)      Objective:   See flowsheet    Assessment:   The patient tolerated treatment well, pain/stiffness with end range shoulder movements. Significant stiffness/hypomobility with GH joint inferior glides      Goals:   Pt will report improved ability to sleep without waking due to shoulder pain   Pt will improve (L) shoulder flexion AAROM to >150 degrees in order to work towards improve ROM/mobility needed for overhead activity   Pt will improve (L) shoulder abduction and/or scaption AAROM to >150 degrees to work towards improving ROM/mobility needed for overhead activity   Pt will improve (L) shoulder AROM flexion and abduction to 145 degrees or greater to improve ability to reach to higher surfaces  Pt will demonstrate HBB reach to approximately upper lumbar area or higher in order to work towards ability to wash back or perform other ADLs  Pt will improve shoulder strength throughout to 4/5 to improve function with carrying/lifting items to different level surfaces  Pt will report being able to don/doff shirts, bra, etc with less restriction and pain   Pt will be independent and compliant with comprehensive HEP to maintain progress achieved in PT     Plan:   Continue PT, progressing ROM, stretching and joint mobilization as tolerated  Date: 11/29/2024  TX#: 2/12 Date: 12/6/24                 TX#: 3/12 Date: 12/11/2024                TX#: 4/12 Date:                 TX#: 5/ Date:   Tx#: 6/   TherEx (30 min) TherEx (30 min) TherEx (15 min)     UBE x 6 min (L1) UBE x 6 min (L1) UBE x 6 min     H/L dowel shoulder press 2 x 10 H/L dowel shoulder flexion 2 x 10 (5 second hold) Supine wand shoulder flexion x 15     H/L dowel shoulder flexion 2 x 10  H/L dowel shoulder ER 2 x 10 (5 second hold) Seated OH pulleys shoulder flexion and horizontal abduction with ER     H/L dowel shoulder ER 2 x 10 OH pulley flexion 2 x 10 (5 second hold) Standing shoulder extension with wand behind back     OH pulley flexion/scaption 2 x 10 (5 second hold) Standing dowel shoulder extension 2 x 10   Attempted standing dowel shoulder extension & adduction x 10 (limited movement) S/L sleeper stretch 3 x 30 sec     PROM (L) shoulder as tolerated PROM/gentle stretching (L) shoulder as tolerated  Supine PROM right shoulder as tolerated     Manual Therapy (15 min) Manual Therapy (15 min) Manual Therapy (10 min)     STM/MFR (L) subscapularis, teres major, lat, infraspinatus  (L) GH joint LAD, grade 3  (L) GH joint inferior & posterior glides, grade 3 GH joint AP/inferior glides grade 3     (L) GH joint LAD, inferior glide, posterior glide, grade 3 STM/MFR infraspinatus, subscapularis, lat, teres major GH joint distraction with passive shoulder ER, IR and flexion       HEP:   Supine dowel press, Supine dowel flexion, OH pulley scaption & flexion, Supine dowel ER, S/L sleeper stretch    Charges: TherEx x 1; MT x 1     Total Timed Treatment: 25 min  Total Treatment Time: 40 min

## 2024-12-13 ENCOUNTER — OFFICE VISIT (OUTPATIENT)
Facility: LOCATION | Age: 64
End: 2024-12-13
Attending: FAMILY MEDICINE
Payer: COMMERCIAL

## 2024-12-13 PROCEDURE — 97140 MANUAL THERAPY 1/> REGIONS: CPT

## 2024-12-13 PROCEDURE — 97110 THERAPEUTIC EXERCISES: CPT

## 2024-12-13 NOTE — PROGRESS NOTES
Diagnosis:   Adhesive capsulitis of left shoulder (M75.02)  Tenosynovitis of left shoulder (M65.912)    Arthrosis of left acromioclavicular joint (M19.012)  Rotator cuff syndrome of left shoulder (M75.102)    Tendinitis of long head of biceps brachii of left shoulder (M75.22)      Referring Provider: Gaetano Chaparro  Date of Evaluation:    11/21/24    Precautions:  None Next MD visit:     Date of Surgery: n/a   Insurance Primary/Secondary: BCBS POS / N/A     # Auth Visits: 12            Subjective:   The patient reports her shoulder feels stiff, but she's been able to sleep better    Pain: 0/10       Objective:   See flowsheet    Assessment:   The patient tolerated treatment well with improving ROM      Goals:   Pt will report improved ability to sleep without waking due to shoulder pain   Pt will improve (L) shoulder flexion AAROM to >150 degrees in order to work towards improve ROM/mobility needed for overhead activity   Pt will improve (L) shoulder abduction and/or scaption AAROM to >150 degrees to work towards improving ROM/mobility needed for overhead activity   Pt will improve (L) shoulder AROM flexion and abduction to 145 degrees or greater to improve ability to reach to higher surfaces  Pt will demonstrate HBB reach to approximately upper lumbar area or higher in order to work towards ability to wash back or perform other ADLs  Pt will improve shoulder strength throughout to 4/5 to improve function with carrying/lifting items to different level surfaces  Pt will report being able to don/doff shirts, bra, etc with less restriction and pain   Pt will be independent and compliant with comprehensive HEP to maintain progress achieved in PT     Plan:   Continue PT, progressing ROM, stretching and joint mobilization as tolerated  Date: 11/29/2024  TX#: 2/12 Date: 12/6/24                TX#: 3/12 Date: 12/11/2024                TX#: 4/12 Date:  12/13/2024                 TX#: 5/12 Date:   Tx#: 6/ TherEx (30 min)  TherEx (30 min) TherEx (15 min) TherEx (25 min)    UBE x 6 min (L1) UBE x 6 min (L1) UBE x 6 min UBE x 6 min    H/L dowel shoulder press 2 x 10 H/L dowel shoulder flexion 2 x 10 (5 second hold) Supine wand shoulder flexion x 15 Seated OH pulleys shoulder flexion and horizontal abd with ER    H/L dowel shoulder flexion 2 x 10  H/L dowel shoulder ER 2 x 10 (5 second hold) Seated OH pulleys shoulder flexion and horizontal abduction with ER Self ER stretch against post 5 x 15 sec    H/L dowel shoulder ER 2 x 10 OH pulley flexion 2 x 10 (5 second hold) Standing shoulder extension with wand behind back Self STM/TPR with ball against wall    OH pulley flexion/scaption 2 x 10 (5 second hold) Standing dowel shoulder extension 2 x 10   Attempted standing dowel shoulder extension & adduction x 10 (limited movement) S/L sleeper stretch 3 x 30 sec Supine PROM left shoulder as tolerated    PROM (L) shoulder as tolerated PROM/gentle stretching (L) shoulder as tolerated  Supine PROM left shoulder as tolerated Reviewed HEP    Manual Therapy (15 min) Manual Therapy (15 min) Manual Therapy (10 min) Manual Therapy (15 min)    STM/MFR (L) subscapularis, teres major, lat, infraspinatus  (L) GH joint LAD, grade 3  (L) GH joint inferior & posterior glides, grade 3 GH joint AP/inferior glides grade 3 GH joint AP/inferior glides grade 3    (L) GH joint LAD, inferior glide, posterior glide, grade 3 STM/MFR infraspinatus, subscapularis, lat, teres major GH joint distraction with passive shoulder ER, IR and flexion GH joint distraction with passive shoulder ER, IR, Flexion      HEP:   Supine dowel press, Supine dowel flexion, OH pulley scaption & flexion, Supine dowel ER, S/L sleeper stretch    Charges: TherEx x 2; MT x 1     Total Timed Treatment: 40 min  Total Treatment Time: 40 min

## 2024-12-16 ENCOUNTER — OFFICE VISIT (OUTPATIENT)
Facility: LOCATION | Age: 64
End: 2024-12-16
Attending: FAMILY MEDICINE
Payer: COMMERCIAL

## 2024-12-16 PROCEDURE — 97110 THERAPEUTIC EXERCISES: CPT

## 2024-12-16 PROCEDURE — 97140 MANUAL THERAPY 1/> REGIONS: CPT

## 2024-12-16 NOTE — PROGRESS NOTES
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 Still very restricted but notices some mild improvement     Pain: 0/10       Objective:   See flowsheet    Assessment:   Pt with GH joint restrictions as well as soft tissue restrictions (L) shoulder complex. Demonstrates some improvement in AA/PROM flexion as well as scaption & ER (still significantly limited however).   Decreased inferior and posterior GH joint glides. Fair tolerance to stretching. Encouraged to heat shoulder for 10-15 minutes and then work on stretches, then ice afterward       Goals:   Pt will report improved ability to sleep without waking due to shoulder pain   Pt will improve (L) shoulder flexion AAROM to >150 degrees in order to work towards improve ROM/mobility needed for overhead activity   Pt will improve (L) shoulder abduction and/or scaption AAROM to >150 degrees to work towards improving ROM/mobility needed for overhead activity   Pt will improve (L) shoulder AROM flexion and abduction to 145 degrees or greater to improve ability to reach to higher surfaces  Pt will demonstrate HBB reach to approximately upper lumbar area or higher in order to work towards ability to wash back or perform other ADLs  Pt will improve shoulder strength throughout to 4/5 to improve function with carrying/lifting items to different level surfaces  Pt will report being able to don/doff shirts, bra, etc with less restriction and  pain   Pt will be independent and compliant with comprehensive HEP to maintain progress achieved in PT     Plan:   Continue PT, progressing ROM, stretching and joint mobilization as tolerated  Date: 11/29/2024  TX#: 2/12 Date: 12/6/24                TX#: 3/12 Date: 12/11/2024                TX#: 4/12 Date:  12/13/2024                 TX#: 5/12 Date: 12/16/24  Tx#: 6/12   TherEx (30 min) TherEx (30 min) TherEx (15 min) TherEx (25 min) TherEx (20 min)   UBE x 6 min (L1) UBE x 6 min (L1) UBE x 6 min UBE x 6 min Supine dowel shoulder flexion x 20    H/L dowel shoulder press 2 x 10 H/L dowel shoulder flexion 2 x 10 (5 second hold) Supine wand shoulder flexion x 15 Seated OH pulleys shoulder flexion and horizontal abd with ER Seated OH pulley shoulder flexion & horizontal abduction with ER x 15 each (5 second hold)   H/L dowel shoulder flexion 2 x 10  H/L dowel shoulder ER 2 x 10 (5 second hold) Seated OH pulleys shoulder flexion and horizontal abduction with ER Self ER stretch against post 5 x 15 sec Supine dowel shoulder ER stretch x 15 (5 second hold)   H/L dowel shoulder ER 2 x 10 OH pulley flexion 2 x 10 (5 second hold) Standing shoulder extension with wand behind back Self STM/TPR with ball against wall SL shoulder IR stretch 3 x 30 seconds   OH pulley flexion/scaption 2 x 10 (5 second hold) Standing dowel shoulder extension 2 x 10   Attempted standing dowel shoulder extension & adduction x 10 (limited movement) S/L sleeper stretch 3 x 30 sec Supine PROM left shoulder as tolerated PROM (L) shoulder as tolerated   PROM (L) shoulder as tolerated PROM/gentle stretching (L) shoulder as tolerated  Supine PROM left shoulder as tolerated Reviewed HEP HEP review   Manual Therapy (15 min) Manual Therapy (15 min) Manual Therapy (10 min) Manual Therapy (15 min) Manual Therapy (10 min)   STM/MFR (L) subscapularis, teres major, lat, infraspinatus  (L) GH joint LAD, grade 3  (L) GH joint inferior & posterior glides, grade 3 GH  joint AP/inferior glides grade 3 GH joint AP/inferior glides grade 3 GH joint AP/inferior glides grade 3   (L) GH joint LAD, inferior glide, posterior glide, grade 3 STM/MFR infraspinatus, subscapularis, lat, teres major GH joint distraction with passive shoulder ER, IR and flexion GH joint distraction with passive shoulder ER, IR, Flexion      HEP:   Supine dowel press, Supine dowel flexion, OH pulley scaption & flexion, Supine dowel ER, S/L sleeper stretch    Charges: TherEx x 1; MT x 1    Total Timed Treatment: 30 min  Total Treatment Time: 30 min

## 2024-12-18 ENCOUNTER — TELEPHONE (OUTPATIENT)
Dept: PHYSICAL THERAPY | Facility: HOSPITAL | Age: 64
End: 2024-12-18

## 2024-12-26 ENCOUNTER — OFFICE VISIT (OUTPATIENT)
Facility: LOCATION | Age: 64
End: 2024-12-26
Attending: FAMILY MEDICINE
Payer: COMMERCIAL

## 2024-12-26 PROCEDURE — 97140 MANUAL THERAPY 1/> REGIONS: CPT

## 2024-12-26 PROCEDURE — 97110 THERAPEUTIC EXERCISES: CPT

## 2024-12-26 NOTE — PROGRESS NOTES
Diagnosis:   Adhesive capsulitis of left shoulder (M75.02)  Tenosynovitis of left shoulder (M65.912)    Arthrosis of left acromioclavicular joint (M19.012)  Rotator cuff syndrome of left shoulder (M75.102)    Tendinitis of long head of biceps brachii of left shoulder (M75.22)      Referring Provider: Gaetano Chaparro  Date of Evaluation:    11/21/24    Precautions:  None Next MD visit:     Date of Surgery: n/a   Insurance Primary/Secondary: BCBS POS / N/A     # Auth Visits: 12            Subjective:   Slowly getting better. Shoulder not as irritable after some of her stretches. Can reach up to her head now with more ease. Still very stiff and difficulty with certain motions    Pain: 0/10 at rest       Objective:   See flowsheet      Assessment:   (L) GH joint inferior & posterior glide restriction. Soft tissue restrictions noted as well  Improved joint play with mobilization. Less irritability with joint mobs. AAROM into flexion/scaption/ER is improving.   Significant restrictions noted with HBB reach       Goals:   Pt will report improved ability to sleep without waking due to shoulder pain   Pt will improve (L) shoulder flexion AAROM to >150 degrees in order to work towards improve ROM/mobility needed for overhead activity   Pt will improve (L) shoulder abduction and/or scaption AAROM to >150 degrees to work towards improving ROM/mobility needed for overhead activity   Pt will improve (L) shoulder AROM flexion and abduction to 145 degrees or greater to improve ability to reach to higher surfaces  Pt will demonstrate HBB reach to approximately upper lumbar area or higher in order to work towards ability to wash back or perform other ADLs  Pt will improve shoulder strength throughout to 4/5 to improve function with carrying/lifting items to different level surfaces  Pt will report being able to don/doff shirts, bra, etc with less restriction and pain   Pt will be independent and compliant with comprehensive HEP to  maintain progress achieved in PT     Plan:   Continue PT, progressing ROM, stretching and joint mobilization as tolerated  Date: 11/29/2024  TX#: 2/12 Date: 12/6/24                TX#: 3/12 Date: 12/11/2024                TX#: 4/12 Date:  12/13/2024                 TX#: 5/12 Date: 12/16/24  Tx#: 6/12 Date: 12/26/24  Tx#: 7/12   TherEx (30 min) TherEx (30 min) TherEx (15 min) TherEx (25 min) TherEx (20 min) TherEx (25 min)   UBE x 6 min (L1) UBE x 6 min (L1) UBE x 6 min UBE x 6 min Supine dowel shoulder flexion x 20  OH pulley flexion & horizontal abduction with ER x 15 (10 second hold)    H/L dowel shoulder press 2 x 10 H/L dowel shoulder flexion 2 x 10 (5 second hold) Supine wand shoulder flexion x 15 Seated OH pulleys shoulder flexion and horizontal abd with ER Seated OH pulley shoulder flexion & horizontal abduction with ER x 15 each (5 second hold) PROM (L) shoulder in all planes within tolerance    H/L dowel shoulder flexion 2 x 10  H/L dowel shoulder ER 2 x 10 (5 second hold) Seated OH pulleys shoulder flexion and horizontal abduction with ER Self ER stretch against post 5 x 15 sec Supine dowel shoulder ER stretch x 15 (5 second hold) S/L shoulder IR stretch 30 sec x 3   H/L dowel shoulder ER 2 x 10 OH pulley flexion 2 x 10 (5 second hold) Standing shoulder extension with wand behind back Self STM/TPR with ball against wall SL shoulder IR stretch 3 x 30 seconds Standing HBB stretch w/strap (gentle) x 10 (10 second hold)   OH pulley flexion/scaption 2 x 10 (5 second hold) Standing dowel shoulder extension 2 x 10   Attempted standing dowel shoulder extension & adduction x 10 (limited movement) S/L sleeper stretch 3 x 30 sec Supine PROM left shoulder as tolerated PROM (L) shoulder as tolerated Intermittent pendulums between sets    PROM (L) shoulder as tolerated PROM/gentle stretching (L) shoulder as tolerated  Supine PROM left shoulder as tolerated Reviewed HEP HEP review    Manual Therapy (15 min) Manual  Therapy (15 min) Manual Therapy (10 min) Manual Therapy (15 min) Manual Therapy (10 min) Manual Therapy (17 min)   STM/MFR (L) subscapularis, teres major, lat, infraspinatus  (L) GH joint LAD, grade 3  (L) GH joint inferior & posterior glides, grade 3 GH joint AP/inferior glides grade 3 GH joint AP/inferior glides grade 3 GH joint AP/inferior glides grade 3 GH joint AP/inferior glides, grade 3  GH joint distraction with passive ER, IR, flexion    (L) GH joint LAD, inferior glide, posterior glide, grade 3 STM/MFR infraspinatus, subscapularis, lat, teres major GH joint distraction with passive shoulder ER, IR and flexion GH joint distraction with passive shoulder ER, IR, Flexion       HEP:   Supine dowel press, Supine dowel flexion, OH pulley scaption & flexion, Supine dowel ER, S/L sleeper stretch    Charges: TherEx - 2; MT - 1  Total Timed Treatment: 42 min  Total Treatment Time: 42 min

## 2025-01-06 ENCOUNTER — TELEPHONE (OUTPATIENT)
Dept: PHYSICAL THERAPY | Facility: HOSPITAL | Age: 65
End: 2025-01-06

## 2025-01-08 ENCOUNTER — OFFICE VISIT (OUTPATIENT)
Facility: LOCATION | Age: 65
End: 2025-01-08
Attending: FAMILY MEDICINE
Payer: COMMERCIAL

## 2025-01-08 PROCEDURE — 97140 MANUAL THERAPY 1/> REGIONS: CPT

## 2025-01-08 PROCEDURE — 97110 THERAPEUTIC EXERCISES: CPT

## 2025-01-08 NOTE — PROGRESS NOTES
Diagnosis:   Adhesive capsulitis of left shoulder (M75.02)  Tenosynovitis of left shoulder (M65.912)    Arthrosis of left acromioclavicular joint (M19.012)  Rotator cuff syndrome of left shoulder (M75.102)    Tendinitis of long head of biceps brachii of left shoulder (M75.22)      Referring Provider: Gaetano Chaparro  Date of Evaluation:    11/21/24    Precautions:  None Next MD visit:     Date of Surgery: n/a   Insurance Primary/Secondary: BCBS POS / N/A     # Auth Visits: 12            Subjective:   Able to wash hair much easier. Elevating arm is less painful and easier. Still difficulty reaching behind back    Pain: 0/10 at rest (pain only when stretching)      Objective:   See flowsheet  Slight improvement with GH joint inferior glide joint play  HBB reach significantly restricted       Assessment:   Tolerated treatment session well. Less irritability with soft tissue treatment and mobilization. Functionally improving with reaching activity  Still very restricted capsule in all planes. HBB reach significantly restricted       Goals:   Pt will report improved ability to sleep without waking due to shoulder pain   Pt will improve (L) shoulder flexion AAROM to >150 degrees in order to work towards improve ROM/mobility needed for overhead activity   Pt will improve (L) shoulder abduction and/or scaption AAROM to >150 degrees to work towards improving ROM/mobility needed for overhead activity   Pt will improve (L) shoulder AROM flexion and abduction to 145 degrees or greater to improve ability to reach to higher surfaces  Pt will demonstrate HBB reach to approximately upper lumbar area or higher in order to work towards ability to wash back or perform other ADLs  Pt will improve shoulder strength throughout to 4/5 to improve function with carrying/lifting items to different level surfaces  Pt will report being able to don/doff shirts, bra, etc with less restriction and pain   Pt will be independent and compliant with  comprehensive HEP to maintain progress achieved in PT     Plan:   Continue PT, progressing ROM, stretching and joint mobilization as tolerated  Date: 11/29/2024  TX#: 2/12 Date: 12/6/24                TX#: 3/12 Date: 12/11/2024                TX#: 4/12 Date:  12/13/2024                 TX#: 5/12 Date: 12/16/24  Tx#: 6/12 Date: 12/26/24  Tx#: 7/12 Date: 1/8/25  Tx#: 8/12   TherEx (30 min) TherEx (30 min) TherEx (15 min) TherEx (25 min) TherEx (20 min) TherEx (25 min) TherEx (30 min)   UBE x 6 min (L1) UBE x 6 min (L1) UBE x 6 min UBE x 6 min Supine dowel shoulder flexion x 20  OH pulley flexion & horizontal abduction with ER x 15 (10 second hold)  PROM (L) shoulder in all planes within tolerance    H/L dowel shoulder press 2 x 10 H/L dowel shoulder flexion 2 x 10 (5 second hold) Supine wand shoulder flexion x 15 Seated OH pulleys shoulder flexion and horizontal abd with ER Seated OH pulley shoulder flexion & horizontal abduction with ER x 15 each (5 second hold) PROM (L) shoulder in all planes within tolerance  H/L dowel shoulder flexion x 15 (5 second hold)   H/L dowel shoulder flexion 2 x 10  H/L dowel shoulder ER 2 x 10 (5 second hold) Seated OH pulleys shoulder flexion and horizontal abduction with ER Self ER stretch against post 5 x 15 sec Supine dowel shoulder ER stretch x 15 (5 second hold) S/L shoulder IR stretch 30 sec x 3 H/L dowel shoulder ER x 15 (5 second hold)   H/L dowel shoulder ER 2 x 10 OH pulley flexion 2 x 10 (5 second hold) Standing shoulder extension with wand behind back Self STM/TPR with ball against wall SL shoulder IR stretch 3 x 30 seconds Standing HBB stretch w/strap (gentle) x 10 (10 second hold) HBH ER x 15 (5 second hold)   OH pulley flexion/scaption 2 x 10 (5 second hold) Standing dowel shoulder extension 2 x 10   Attempted standing dowel shoulder extension & adduction x 10 (limited movement) S/L sleeper stretch 3 x 30 sec Supine PROM left shoulder as tolerated PROM (L) shoulder as  tolerated Intermittent pendulums between sets  OH pulley flexion, scaption, horizontal shoulder abduction w/ER x 15 (10 second hold each)   PROM (L) shoulder as tolerated PROM/gentle stretching (L) shoulder as tolerated  Supine PROM left shoulder as tolerated Reviewed HEP HEP review  S/L shoulder IR stretch x 10 (10 second hold)                     Manual Therapy (15 min) Manual Therapy (15 min) Manual Therapy (10 min) Manual Therapy (15 min) Manual Therapy (10 min) Manual Therapy (17 min) Manual Therapy (15 min)   STM/MFR (L) subscapularis, teres major, lat, infraspinatus  (L) GH joint LAD, grade 3  (L) GH joint inferior & posterior glides, grade 3 GH joint AP/inferior glides grade 3 GH joint AP/inferior glides grade 3 GH joint AP/inferior glides grade 3 GH joint AP/inferior glides, grade 3  GH joint distraction with passive ER, IR, flexion  GH joint AP/inferior glide, grade 3  STM shoulder complex    (L) GH joint LAD, inferior glide, posterior glide, grade 3 STM/MFR infraspinatus, subscapularis, lat, teres major GH joint distraction with passive shoulder ER, IR and flexion GH joint distraction with passive shoulder ER, IR, Flexion        HEP:   Supine dowel press, Supine dowel flexion, OH pulley scaption & flexion, Supine dowel ER, S/L sleeper stretch    Charges: TherEx - 2; MT - 1  Total Timed Treatment: 45 min  Total Treatment Time: 45 min

## 2025-01-14 ENCOUNTER — OFFICE VISIT (OUTPATIENT)
Facility: LOCATION | Age: 65
End: 2025-01-14
Attending: FAMILY MEDICINE
Payer: COMMERCIAL

## 2025-01-14 PROCEDURE — 97140 MANUAL THERAPY 1/> REGIONS: CPT

## 2025-01-14 PROCEDURE — 97110 THERAPEUTIC EXERCISES: CPT

## 2025-01-14 NOTE — PROGRESS NOTES
Diagnosis:   Adhesive capsulitis of left shoulder (M75.02)  Tenosynovitis of left shoulder (M65.912)    Arthrosis of left acromioclavicular joint (M19.012)  Rotator cuff syndrome of left shoulder (M75.102)    Tendinitis of long head of biceps brachii of left shoulder (M75.22)      Referring Provider: Gaetano Chaparro  Date of Evaluation:    11/21/24    Precautions:  None Next MD visit:     Date of Surgery: n/a   Insurance Primary/Secondary: BCBS POS / N/A     # Auth Visits: 12            Subjective:   Reaching to higher surfaces is a bit easier, can wash hair and grab seat belt a bit easier. Still trying to reach behind her back     Pain: 0/10 at rest       Objective:   See flowsheet  HBB to sacrum with assistance (significantly limited)       Assessment:   Pt demonstrating improvements in AAROM into flexion, scaption, & ER. Functionally her reaching has improved with less pain, able to perform some ADLs with less pain and restriction. Less irritability with joint mobilizations and soft tissue treatment       Goals:   Pt will report improved ability to sleep without waking due to shoulder pain   Pt will improve (L) shoulder flexion AAROM to >150 degrees in order to work towards improve ROM/mobility needed for overhead activity   Pt will improve (L) shoulder abduction and/or scaption AAROM to >150 degrees to work towards improving ROM/mobility needed for overhead activity   Pt will improve (L) shoulder AROM flexion and abduction to 145 degrees or greater to improve ability to reach to higher surfaces  Pt will demonstrate HBB reach to approximately upper lumbar area or higher in order to work towards ability to wash back or perform other ADLs  Pt will improve shoulder strength throughout to 4/5 to improve function with carrying/lifting items to different level surfaces  Pt will report being able to don/doff shirts, bra, etc with less restriction and pain   Pt will be independent and compliant with comprehensive HEP to  maintain progress achieved in PT     Plan:   Continue PT, progressing ROM, stretching and joint mobilization as tolerated  Date: 11/29/2024  TX#: 2/12 Date: 12/6/24                TX#: 3/12 Date: 12/11/2024                TX#: 4/12 Date:  12/13/2024                 TX#: 5/12 Date: 12/16/24  Tx#: 6/12 Date: 12/26/24  Tx#: 7/12 Date: 1/8/25  Tx#: 8/12 Date: 1/14/25  Tx#: 9/12   TherEx (30 min) TherEx (30 min) TherEx (15 min) TherEx (25 min) TherEx (20 min) TherEx (25 min) TherEx (30 min) TherEx (15 min)   UBE x 6 min (L1) UBE x 6 min (L1) UBE x 6 min UBE x 6 min Supine dowel shoulder flexion x 20  OH pulley flexion & horizontal abduction with ER x 15 (10 second hold)  PROM (L) shoulder in all planes within tolerance  OH pulley flexion & horizontal abduction x 15 (5 second each)    H/L dowel shoulder press 2 x 10 H/L dowel shoulder flexion 2 x 10 (5 second hold) Supine wand shoulder flexion x 15 Seated OH pulleys shoulder flexion and horizontal abd with ER Seated OH pulley shoulder flexion & horizontal abduction with ER x 15 each (5 second hold) PROM (L) shoulder in all planes within tolerance  H/L dowel shoulder flexion x 15 (5 second hold) H/L dowel flexion x 15 (5 second hold)    H/L dowel shoulder flexion 2 x 10  H/L dowel shoulder ER 2 x 10 (5 second hold) Seated OH pulleys shoulder flexion and horizontal abduction with ER Self ER stretch against post 5 x 15 sec Supine dowel shoulder ER stretch x 15 (5 second hold) S/L shoulder IR stretch 30 sec x 3 H/L dowel shoulder ER x 15 (5 second hold) Standing dowel shoulder flexion & scaption x 15 (5 second hold)   H/L dowel shoulder ER 2 x 10 OH pulley flexion 2 x 10 (5 second hold) Standing shoulder extension with wand behind back Self STM/TPR with ball against wall SL shoulder IR stretch 3 x 30 seconds Standing HBB stretch w/strap (gentle) x 10 (10 second hold) HBH ER x 15 (5 second hold) Standing dowel extension x 10 (5 second hold)   OH pulley flexion/scaption 2 x 10  (5 second hold) Standing dowel shoulder extension 2 x 10   Attempted standing dowel shoulder extension & adduction x 10 (limited movement) S/L sleeper stretch 3 x 30 sec Supine PROM left shoulder as tolerated PROM (L) shoulder as tolerated Intermittent pendulums between sets  OH pulley flexion, scaption, horizontal shoulder abduction w/ER x 15 (10 second hold each) HBB w/strap x 8 (10 second hold)   PROM (L) shoulder as tolerated PROM/gentle stretching (L) shoulder as tolerated  Supine PROM left shoulder as tolerated Reviewed HEP HEP review  S/L shoulder IR stretch x 10 (10 second hold)                        Manual Therapy (15 min) Manual Therapy (15 min) Manual Therapy (10 min) Manual Therapy (15 min) Manual Therapy (10 min) Manual Therapy (17 min) Manual Therapy (15 min) Manual Therapy (27 min)   STM/MFR (L) subscapularis, teres major, lat, infraspinatus  (L) GH joint LAD, grade 3  (L) GH joint inferior & posterior glides, grade 3 GH joint AP/inferior glides grade 3 GH joint AP/inferior glides grade 3 GH joint AP/inferior glides grade 3 GH joint AP/inferior glides, grade 3  GH joint distraction with passive ER, IR, flexion  GH joint AP/inferior glide, grade 3  STM shoulder complex  GH joint AP/inferior glide, grade 3  STM to lat, subscapularis, infraspinatus  S/L scapular mobilization   (L) GH joint LAD, inferior glide, posterior glide, grade 3 STM/MFR infraspinatus, subscapularis, lat, teres major GH joint distraction with passive shoulder ER, IR and flexion GH joint distraction with passive shoulder ER, IR, Flexion         HEP:   Supine dowel press, Supine dowel flexion, OH pulley scaption & flexion, Supine dowel ER, S/L sleeper stretch    Charges: TherEx - 1; MT - 2  Total Timed Treatment: 42 min  Total Treatment Time: 42 min

## 2025-01-27 ENCOUNTER — OFFICE VISIT (OUTPATIENT)
Facility: LOCATION | Age: 65
End: 2025-01-27
Attending: FAMILY MEDICINE
Payer: COMMERCIAL

## 2025-01-27 PROCEDURE — 97110 THERAPEUTIC EXERCISES: CPT

## 2025-01-27 PROCEDURE — 97140 MANUAL THERAPY 1/> REGIONS: CPT

## 2025-01-27 NOTE — PROGRESS NOTES
Diagnosis:   Adhesive capsulitis of left shoulder (M75.02)  Tenosynovitis of left shoulder (M65.912)    Arthrosis of left acromioclavicular joint (M19.012)  Rotator cuff syndrome of left shoulder (M75.102)    Tendinitis of long head of biceps brachii of left shoulder (M75.22)      Referring Provider: Gaetano Chaparro  Date of Evaluation:    11/21/24    Precautions:  None Next MD visit:     Date of Surgery: n/a   Insurance Primary/Secondary: BCBS POS / N/A     # Auth Visits: 12            Subjective:   Has some increased pain lately in front of shoulder. Everything has improved to a certain extent into terms of mobility & ROM however still significantly restricted with hand behind back reach    Pain: 0/10 at rest       Objective:   See flowsheet  Decreased inferior & posterior GH joint glide (improved joint play overall)      Assessment:   Pt demonstrates improved A/AA/PROM (L) shoulder flexion & abduction/scaption. Continues to have combination of capsular & soft tissue restrictions.   HBB reach still very limited (able to reach to sacrum). She did report a slightly easier time reaching behind back post scapular mobilization and soft tissue work. Suggested re-visiting self soft tissue treatment to those areas reviewed and then work on some of the stretching exercises       Goals:   Pt will report improved ability to sleep without waking due to shoulder pain   Pt will improve (L) shoulder flexion AAROM to >150 degrees in order to work towards improve ROM/mobility needed for overhead activity   Pt will improve (L) shoulder abduction and/or scaption AAROM to >150 degrees to work towards improving ROM/mobility needed for overhead activity   Pt will improve (L) shoulder AROM flexion and abduction to 145 degrees or greater to improve ability to reach to higher surfaces  Pt will demonstrate HBB reach to approximately upper lumbar area or higher in order to work towards ability to wash back or perform other ADLs  Pt will  improve shoulder strength throughout to 4/5 to improve function with carrying/lifting items to different level surfaces  Pt will report being able to don/doff shirts, bra, etc with less restriction and pain   Pt will be independent and compliant with comprehensive HEP to maintain progress achieved in PT     Plan:   Continue PT, progressing ROM, stretching and joint mobilization as tolerated  Date: 12/16/24  Tx#: 6/12 Date: 12/26/24  Tx#: 7/12 Date: 1/8/25  Tx#: 8/12 Date: 1/14/25  Tx#: 9/12 Date: 1/27/25  Tx#: 10/12   TherEx (20 min) TherEx (25 min) TherEx (30 min) TherEx (15 min) TherEx (20 min)   Supine dowel shoulder flexion x 20  OH pulley flexion & horizontal abduction with ER x 15 (10 second hold)  PROM (L) shoulder in all planes within tolerance  OH pulley flexion & horizontal abduction x 15 (5 second each)  UBE x 4 min (2 forward/2 backward)   Seated OH pulley shoulder flexion & horizontal abduction with ER x 15 each (5 second hold) PROM (L) shoulder in all planes within tolerance  H/L dowel shoulder flexion x 15 (5 second hold) H/L dowel flexion x 15 (5 second hold)  H/L dowel flexion x 20 (5 second hold)   Supine dowel shoulder ER stretch x 15 (5 second hold) S/L shoulder IR stretch 30 sec x 3 H/L dowel shoulder ER x 15 (5 second hold) Standing dowel shoulder flexion & scaption x 15 (5 second hold) OH pulley flexion & horizontal abduction x 20 (5 second hold)   SL shoulder IR stretch 3 x 30 seconds Standing HBB stretch w/strap (gentle) x 10 (10 second hold) HBH ER x 15 (5 second hold) Standing dowel extension x 10 (5 second hold) S/L IR stretch x 10 (10 second hold)   PROM (L) shoulder as tolerated Intermittent pendulums between sets  OH pulley flexion, scaption, horizontal shoulder abduction w/ER x 15 (10 second hold each) HBB w/strap x 8 (10 second hold) Wall flexion stretch x 10 (5 second hold)   HEP review  S/L shoulder IR stretch x 10 (10 second hold)  HEP review (suggested some self soft tissue  release to periscapular musculature as well as UT)                 Manual Therapy (10 min) Manual Therapy (17 min) Manual Therapy (15 min) Manual Therapy (27 min) Manual Therapy (25 min)   GH joint AP/inferior glides grade 3 GH joint AP/inferior glides, grade 3  GH joint distraction with passive ER, IR, flexion  GH joint AP/inferior glide, grade 3  STM shoulder complex  GH joint AP/inferior glide, grade 3  STM to lat, subscapularis, infraspinatus  S/L scapular mobilization GH joint AP & inferior glide, grade 3/4  S/L scapular mobilization (posterior tilt, inferior glide, downward rotation)            HEP:   Supine dowel press, Supine dowel flexion, OH pulley scaption & flexion, Supine dowel ER, S/L sleeper stretch    Charges: TherEx - 1; MT - 2  Total Timed Treatment: 45 min  Total Treatment Time: 45 min

## 2025-01-28 ENCOUNTER — TELEPHONE (OUTPATIENT)
Dept: PHYSICAL THERAPY | Facility: HOSPITAL | Age: 65
End: 2025-01-28

## 2025-01-29 ENCOUNTER — APPOINTMENT (OUTPATIENT)
Facility: LOCATION | Age: 65
End: 2025-01-29
Attending: FAMILY MEDICINE
Payer: COMMERCIAL

## 2025-02-04 ENCOUNTER — OFFICE VISIT (OUTPATIENT)
Facility: LOCATION | Age: 65
End: 2025-02-04
Attending: FAMILY MEDICINE
Payer: COMMERCIAL

## 2025-02-04 PROCEDURE — 97110 THERAPEUTIC EXERCISES: CPT

## 2025-02-04 PROCEDURE — 97140 MANUAL THERAPY 1/> REGIONS: CPT

## 2025-02-04 NOTE — PROGRESS NOTES
Diagnosis:   Adhesive capsulitis of left shoulder (M75.02)  Tenosynovitis of left shoulder (M65.912)    Arthrosis of left acromioclavicular joint (M19.012)  Rotator cuff syndrome of left shoulder (M75.102)    Tendinitis of long head of biceps brachii of left shoulder (M75.22)      Referring Provider: Gaetano Chaparro  Date of Evaluation:    11/21/24    Precautions:  None Next MD visit:     Date of Surgery: n/a   Insurance Primary/Secondary: BCBS POS / N/A     # Auth Visits: 12            Subjective:   Little improvement in HBB reach. Reaching for things are much better, can grab her seat belt with her (L) arm with more ease. No problems dressing and bathing with exception of washing back     Pain: 0/10 at rest       Objective:   See flowsheet  Moderate to significant tightness (L) UT, posterior shoulder, medial border of scapula       Assessment:   Pt with improving ROM in all planes (most notable in flexion, ER, scaption/abduction). Modest improvements in HBB reach but she continues to work on stretching diligently on her own. Improvement in GH joint glides since evaluation.       Goals:   Pt will report improved ability to sleep without waking due to shoulder pain   Pt will improve (L) shoulder flexion AAROM to >150 degrees in order to work towards improve ROM/mobility needed for overhead activity   Pt will improve (L) shoulder abduction and/or scaption AAROM to >150 degrees to work towards improving ROM/mobility needed for overhead activity   Pt will improve (L) shoulder AROM flexion and abduction to 145 degrees or greater to improve ability to reach to higher surfaces  Pt will demonstrate HBB reach to approximately upper lumbar area or higher in order to work towards ability to wash back or perform other ADLs  Pt will improve shoulder strength throughout to 4/5 to improve function with carrying/lifting items to different level surfaces  Pt will report being able to don/doff shirts, bra, etc with less restriction  and pain   Pt will be independent and compliant with comprehensive HEP to maintain progress achieved in PT     Plan:   Continue PT, progressing ROM, stretching and joint mobilization as tolerated  Date: 12/16/24  Tx#: 6/12 Date: 12/26/24  Tx#: 7/12 Date: 1/8/25  Tx#: 8/12 Date: 1/14/25  Tx#: 9/12 Date: 1/27/25  Tx#: 10/12 Date: 2/4/25  Tx#: 11/12   TherEx (20 min) TherEx (25 min) TherEx (30 min) TherEx (15 min) TherEx (20 min) TherEx (30 min)   Supine dowel shoulder flexion x 20  OH pulley flexion & horizontal abduction with ER x 15 (10 second hold)  PROM (L) shoulder in all planes within tolerance  OH pulley flexion & horizontal abduction x 15 (5 second each)  UBE x 4 min (2 forward/2 backward) UBE x 4 min (2 forward/backward)   Seated OH pulley shoulder flexion & horizontal abduction with ER x 15 each (5 second hold) PROM (L) shoulder in all planes within tolerance  H/L dowel shoulder flexion x 15 (5 second hold) H/L dowel flexion x 15 (5 second hold)  H/L dowel flexion x 20 (5 second hold) OH pulley flexion & shoulder horizontal abduction x 20   Supine dowel shoulder ER stretch x 15 (5 second hold) S/L shoulder IR stretch 30 sec x 3 H/L dowel shoulder ER x 15 (5 second hold) Standing dowel shoulder flexion & scaption x 15 (5 second hold) OH pulley flexion & horizontal abduction x 20 (5 second hold) H/L dowel shoulder flexion x 15 (5 second hold)   SL shoulder IR stretch 3 x 30 seconds Standing HBB stretch w/strap (gentle) x 10 (10 second hold) HBH ER x 15 (5 second hold) Standing dowel extension x 10 (5 second hold) S/L IR stretch x 10 (10 second hold) H/L dowel shoulder ER x 15 (5 second hold)   PROM (L) shoulder as tolerated Intermittent pendulums between sets  OH pulley flexion, scaption, horizontal shoulder abduction w/ER x 15 (10 second hold each) HBB w/strap x 8 (10 second hold) Wall flexion stretch x 10 (5 second hold) S/L IR stretch x 10 (10 second hold)   HEP review  S/L shoulder IR stretch x 10 (10  second hold)  HEP review (suggested some self soft tissue release to periscapular musculature as well as UT) PT assisted (R) shoulder stretching into flexion, abduction, ER, & IR as tolerated                    Manual Therapy (10 min) Manual Therapy (17 min) Manual Therapy (15 min) Manual Therapy (27 min) Manual Therapy (25 min) Manual Therapy (15 min)   GH joint AP/inferior glides grade 3 GH joint AP/inferior glides, grade 3  GH joint distraction with passive ER, IR, flexion  GH joint AP/inferior glide, grade 3  STM shoulder complex  GH joint AP/inferior glide, grade 3  STM to lat, subscapularis, infraspinatus  S/L scapular mobilization GH joint AP & inferior glide, grade 3/4  S/L scapular mobilization (posterior tilt, inferior glide, downward rotation) GH joint AP & inferior glide, grade 3/4  SL scapular mobilization   Massage gun/STM to (L) UT, periscapular musculature              HEP:   Supine dowel press, Supine dowel flexion, OH pulley scaption & flexion, Supine dowel ER, S/L sleeper stretch    Charges: TherEx - 2; MT - 1  Total Timed Treatment: 45 min  Total Treatment Time: 45 min

## 2025-02-13 ENCOUNTER — APPOINTMENT (OUTPATIENT)
Facility: LOCATION | Age: 65
End: 2025-02-13
Attending: FAMILY MEDICINE
Payer: COMMERCIAL

## 2025-03-04 ENCOUNTER — APPOINTMENT (OUTPATIENT)
Facility: LOCATION | Age: 65
End: 2025-03-04
Attending: FAMILY MEDICINE
Payer: COMMERCIAL

## 2025-03-27 ENCOUNTER — OFFICE VISIT (OUTPATIENT)
Dept: NUTRITION | Facility: HOSPITAL | Age: 65
End: 2025-03-27
Attending: INTERNAL MEDICINE
Payer: COMMERCIAL

## 2025-03-27 PROBLEM — E66.3 OVERWEIGHT (BMI 25.0-29.9): Status: ACTIVE | Noted: 2025-03-27

## 2025-03-27 PROCEDURE — 97802 MEDICAL NUTRITION INDIV IN: CPT

## 2025-03-27 NOTE — PROGRESS NOTES
ADULT OUTPATIENT NUTRITION CONSULTATION      Medical Diagnosis: Weight loss management    PMH:   Past Medical History:    Arthritis    Not diagnosed wrist    Back pain    Sometimes    Body piercing    Ears    Bronchitis, not specified as acute or chronic    Congestive heart disease (HCC)    25%    Counseling on substance use and abuse    Decorative tattoo    Heart attack (HCC)    NSTEMI    High cholesterol    Hoarseness, chronic    Get it off and on when i get sinus congestion    Leaking of urine    Stess incontinence    Night sweats    Other, multiple and ill-defined closed fractures of lower limb    R    Pain in joints    Rt wrist    Skin blushing/flushing    Flush very easy    Wears glasses    Reading glasses       Meds: fiber gummy, probiotic, Vit.D, Vit.C, Zinc, B complex, Mg    Labs: T    ANTHROPOMETRICS:  Ht:  5'6  Wt: 180#   BMI: 29  AdjBW: 150#    Current Diet: Regular  Client Hx: 64 year old female  Food Allergies: none. Dislikes salmon and black beans  Cultural/Ethnic/Zoroastrian Preferences Addressed: Yes    Food/Beverage Intake:   Breakfast  yogurt   Lunch  Leftovers or PB&J, fruit, catered food at work 2-3x/month   Dinner Limiting pasta, rice, bread, more veggies, salad, chicken or beef, shrimp   Snacks Cottage cheese, salty chips, crackers   Beverages Water, iced tea, 1c coffee w creamer/dy, avoids soda   Meal pattern: 2-3 meals/d, 1-3 snacks/d  Diet Quality: Needs Improvement  Number of meals/week eaten at restaurants: 1x  Alcohol Intake: not asked   Physical Activity: walking  GOAL: up to 7,000 steps, 150+ min/wk cardio, adding weights 2-3x/wk   Sleep: 6-8 hrs/d  Stress/Anxiety: low  Coping Mechanism: reviewed    NUTRITION PRESCRIPTION:  81.8kg Actual Body Weight  Calories: 1934-3913 calories/day ([place] Jeor)  Protein: 70-80 grams protein/day (1 grams protein per kg)    Nutrition Diagnosis: Food and Nutrition related knowledge deficit related to lack of previous education as evidenced by pt  need for education regarding weight loss management.    Nutrition Intervention: Comprehensive nutrition education and evaluation provided for weight loss management. Pt reports frustration with recent weight gain since menopause. 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 Encouraged behavior changes and making appointments with self. Discussed nutrient dense food options, eating every 3-4 hours to help with portion control. Encouraged lean meats, low fat dairy, whole grains, more omega 3 rich foods, fiber, and veggies. Suggested Jumpstart program for accountability, pt to log in [employer] prema.  Suggest 7-10% loss of body weight over 6 months. (~18#).   Written materials were issued and explained, all questions answered today    Goal 1: log intake New  Goal 2: nutrient dense food choices every 3-4 hrsNew  Goal 3: increase activity/weightsNew    Monitoring/Evaluation:  Pt to follow with MD for wt, labs, meds.  Follow up appt scheduled for 5/12/25, 8:30am in [facility]. Pt encouraged to register for Jumpstart session.     Time spent with patient: 75 minutes    Thank you for the referral,    Rosalia Pelayo RD, LDN  [email or web address]  P: [phone]

## 2025-05-20 ENCOUNTER — PATIENT MESSAGE (OUTPATIENT)
Facility: CLINIC | Age: 65
End: 2025-05-20

## 2025-06-02 ENCOUNTER — OFFICE VISIT (OUTPATIENT)
Facility: CLINIC | Age: 65
End: 2025-06-02
Payer: COMMERCIAL

## 2025-06-02 VITALS
BODY MASS INDEX: 29.82 KG/M2 | HEIGHT: 65 IN | WEIGHT: 179 LBS | SYSTOLIC BLOOD PRESSURE: 118 MMHG | HEART RATE: 88 BPM | RESPIRATION RATE: 18 BRPM | OXYGEN SATURATION: 98 % | DIASTOLIC BLOOD PRESSURE: 60 MMHG

## 2025-06-02 DIAGNOSIS — E88.819 INSULIN RESISTANCE: ICD-10-CM

## 2025-06-02 DIAGNOSIS — R73.01 IFG (IMPAIRED FASTING GLUCOSE): ICD-10-CM

## 2025-06-02 DIAGNOSIS — Z51.81 ENCOUNTER FOR THERAPEUTIC DRUG LEVEL MONITORING: Primary | ICD-10-CM

## 2025-06-02 DIAGNOSIS — E78.1 HYPERTRIGLYCERIDEMIA: ICD-10-CM

## 2025-06-02 DIAGNOSIS — I51.81 TAKOTSUBO CARDIOMYOPATHY: ICD-10-CM

## 2025-06-02 DIAGNOSIS — I21.4 NSTEMI (NON-ST ELEVATED MYOCARDIAL INFARCTION) (HCC): ICD-10-CM

## 2025-06-02 DIAGNOSIS — E66.3 OVERWEIGHT (BMI 25.0-29.9): ICD-10-CM

## 2025-06-02 NOTE — PROGRESS NOTES
HISTORY OF PRESENT ILLNESS  Chief Complaint   Patient presents with    Weight Problem     Ref by PCP, no prior [facility] management, open to meds       Ashly De Dios is a 65 year old female new to our office today for initiation of medical weight loss program.  Patient presents today with c/o excess weight.       Has never had to worry about weight until 40's  Now feels like she is continuing to gain weight  Doesn't feel comfortable with weight  Takotsubo cardiomyopathy so worried about heart    Reason/goal for weight loss: 30/40lbs, keep weight off    Previous weight loss efforts in the past/medication: tried dietary changes    Interest in Bariatric surgery:     Barriers to weight loss:     Wt Readings from Last 6 Encounters:   06/02/25 179 lb (81.2 kg)   01/14/25 180 lb (81.6 kg)   11/15/24 171 lb (77.6 kg)   10/28/24 171 lb (77.6 kg)   10/16/24 171 lb (77.6 kg)   10/02/24 173 lb (78.5 kg)          Breakfast Lunch Dinner Snacks Fluids   Oikos triple zero  Coffee  Cottage cheese before lunch [place], but trying to not eat as much  leftovers Protein  Veggie   Nuts  Chips      Social hx and lifestyle reviewed:    Work: nurse, sits more in the office  Marital status: yes  Support: Yes  Tobacco use: None  ETOH use: none  Supplements: B12, zinc, vit c, vit d  Exercise: used to enjoy but hasn't gotten back to it  Stress level: manageable  Sleep hours and integrity: 7hrs a night, does not feel well rested    MEDICAL HISTORY  PMH reviewed:   Cardiac disorders: Yes, cardiomyopathy, NSTEMI  Depression/anxiety: negative   Glaucoma: negative   Kidney stones: negative   Eating disorder: negative   Migraines: negative   Seizures: negative   Joint-related conditions: hips, fingers, wrist  Liver disease: negative   Renal disease: negative   Diabetes: negative  Thyroid disease: negative   Constipation: negative  Miscarriage: negative   DVT: negative    Family or personal history of Pancreatic issues / Medullary Thyroid Cancer: negative     History of bariatric surgery: negative     FMH reviewed: obesity in parent/s or sibling:     REVIEW OF SYSTEMS  GENERAL: feels well otherwise  NECK: denies thickening   LUNGS: denies shortness of breath with exertion, no apnea  CARDIOVASCULAR: denies chest pain on exertion , denies palpitations or pedal edema  GI: denies abdominal pain.  No N/V/D/C  MUSCULOSKELETAL: see above  NEURO: denies headaches   PSYCH: denies change in behavior or mood, denies feeling sad or depressed     EXAM  /60   Pulse 88   Resp 18   Ht 5' 5\" (1.651 m)   Wt 179 lb (81.2 kg)   SpO2 98%   BMI 29.79 kg/m² , Percent body fat: F >32%        GENERAL: well developed, well nourished, in no apparent distress  SKIN: warm, pink, dry without rashes to exposed area   EYES: conjunctiva pink, sclera non icteric, PERRL  HEENT: atraumatic, normocephalic, O/p: Mallampati score-  NECK: supple, non tender, no adenopathy, no thyromegaly  LUNGS: CTA in all fields, breathing non labored  CARDIO: RRR without murmur, normal S1 and S2 without clicks or gallops, no pedal edema. EKG not completed in office  GI: rotund, no masses, HSM or tenderness  MUSCULOSKELETAL: grossly intact   NEURO: Oriented times three, full ROM of bilateral UE/LE  PSYCH: pleasant, cooperative, normal mood and affect    Lab Results   Component Value Date    WBC 6.7 07/18/2024    RBC 4.44 07/18/2024    HGB 13.5 07/18/2024    HCT 41.4 07/18/2024    MCV 93.2 07/18/2024    MCH 30.4 07/18/2024    MCHC 32.6 07/18/2024    RDW 13.0 07/18/2024     07/18/2024     Lab Results   Component Value Date    GLU 89 07/18/2024    BUN 21 07/18/2024    BUNCREA SEE NOTE: 07/18/2024    CREATSERUM 0.67 07/18/2024    ANIONGAP 3 10/31/2021     08/28/2015    GFRNAA 91 10/31/2021    GFRAA 105 10/31/2021    CA 9.1 07/18/2024    OSMOCALC 292 10/31/2021    ALKPHO 88 07/18/2024    AST 18 07/18/2024    ALT 20 07/18/2024    BILT 0.8 07/18/2024    TP 6.6 07/18/2024    ALB 4.1 07/18/2024    GLOBULIN  2.5 07/18/2024    AGRATIO 1.6 07/18/2024     07/18/2024    K 4.2 07/18/2024     07/18/2024    CO2 27 07/18/2024     Lab Results   Component Value Date     10/30/2021    A1C 5.5 10/30/2021     Lab Results   Component Value Date    CHOLEST 122 07/18/2024    TRIG 236 (H) 07/18/2024    HDL 46 (L) 07/18/2024    LDL 47 07/18/2024    VLDL 52 (H) 08/28/2015    TCHDLRATIO 2.7 07/18/2024    NONHDLC 76 07/18/2024     Lab Results   Component Value Date    T4F 1.3 08/29/2016    TSH 1.970 10/30/2021    TSHT4 1.14 07/18/2024     Lab Results   Component Value Date    VITB12 372 11/07/2011     Lab Results   Component Value Date    VITD 49 07/18/2024    QVITD 26 11/07/2011       Medications Ordered Prior to Encounter[1]    ASSESSMENT  Initial Weight Data and Goal Weight Loss:  Weight Calculations  Initial Weight: 179 lbs  Initial Weight Date: 06/02/25  Today's Weight: 179 lbs  5% Goal: 8.95  10% Goal: 17.9  Total Weight Loss: 0 lbs    Diagnoses and all orders for this visit:    Encounter for therapeutic drug level monitoring  -     semaglutide-weight management 0.25 MG/0.5ML Subcutaneous Solution Auto-injector; Inject 0.5 mL (0.25 mg total) into the skin once a week for 4 doses.    Overweight (BMI 25.0-29.9)  -     semaglutide-weight management 0.25 MG/0.5ML Subcutaneous Solution Auto-injector; Inject 0.5 mL (0.25 mg total) into the skin once a week for 4 doses.    IFG (impaired fasting glucose)  -     semaglutide-weight management 0.25 MG/0.5ML Subcutaneous Solution Auto-injector; Inject 0.5 mL (0.25 mg total) into the skin once a week for 4 doses.    Insulin resistance    Hypertriglyceridemia  -     semaglutide-weight management 0.25 MG/0.5ML Subcutaneous Solution Auto-injector; Inject 0.5 mL (0.25 mg total) into the skin once a week for 4 doses.    NSTEMI (non-ST elevated myocardial infarction) (HCC)  -     semaglutide-weight management 0.25 MG/0.5ML Subcutaneous Solution Auto-injector; Inject 0.5 mL (0.25 mg  total) into the skin once a week for 4 doses.    Takotsubo cardiomyopathy  -     semaglutide-weight management 0.25 MG/0.5ML Subcutaneous Solution Auto-injector; Inject 0.5 mL (0.25 mg total) into the skin once a week for 4 doses.        PLAN  Weight Calculations  Initial Weight: 179 lbs  Initial Weight Date: 06/02/25  Today's Weight: 179 lbs  5% Goal: 8.95  10% Goal: 17.9  Total Weight Loss: 0 lbs    Will begin Wegovy 0.25mg weekly - denies any personal or family history of pancreatitis, pancreatic cancer, thyroid cancer, MEN2 - discussed MOA, advised side effects and adverse effects of medication.  Cardiomyopathy/h/o NSTEMI - stable, continue current medications, f/u with cardiologist  IFG - possible addition of metformin  Insulin resistance - trig/hdl >3, begin medications as prescribed, low carb diet  Begin logging foods - discussed macronutrient goals  -low carb high protein  -portion control  -Limit carbohydrates  -Eat breakfast every day   -Don't skip meals   -Read nutrition labels and keep a food log  -drink a lot of water 65 oz of water per day  - Do not drink your calories (no soda, juice, high calorie coffee drinks, limit alcohol)  - Do not eat late at night  Medication use and side effects reviewed with patient.  Medication contraindications: stimulant  Follow up with dietitian and psychologist as recommended.  Discussed the role of sleep and stress in weight management.  Labs orders as above.  Counseled on comprehensive weight loss plan including attention to nutrition, exercise and behavior/stress management for success. See patient instruction below for more details.  Weight Loss Consent to treat reviewed and signed.    Total time spent on chart review, pre-charting, obtaining history, counseling, and educating, reviewing labs was 45 minutes.      Patient Instructions   1200 calories a day    Moderate Carb (30/35/35)  85g  Protein    44g  Fats    99g  Carbs    Lower Carb  (40/40/20)  114g  Protein    50g  Fats    57g  Carbs    We are here to support you with weight loss, but please remember that you still need your primary care provider for your routine health maintenance.      PLAN:  Begin Wegovy  Follow up with me in 4 months  Schedule follow up appointments: Renard Mosley (dietitian) or Debby Stone (presurgery dietitian)   Check for insurance coverage for dietitian and labwork prior to scheduling appointment.     Please try to work on the following dietary changes:  Goals: Aim for 20-30 grams of protein/ meal  Eat 4-6 vegetables/day  Avoid skipping meals- eat every 4-5 hours  Aim for 3 meals/day  2. Drink lots of water and cut down on soda/juice consumption if soda/juice drinker  3. Focus on protein: (15-30 grams with each meal) ie. greek yogurt, cottage cheese, string cheese, hard boiled eggs  4. Healthy snacks: peanut butter and apples, hummus and carrots, berries, nuts (1/4 cup), tuna and crackers                 Protein Shakes: Premier protein or Core Power                Protein Bars: Rx Bars, Oatmega, Power Crunch                 S[place] balanced breaks (cheese and nuts)- without chocolate  5. Reduce carbohydrates which includes sweets as well as rice, pasta, potatoes, bread, corn and instead choose whole grain options or more protein or vegetables (4-6 servings of vegetables per day)  6. Get a good night of sleep  7. Try to decrease stress in life     Please download apps:  1. My Fitness Pal, Lose it, My Net Diary prema to help you to monitor daily dietary intake and you will be able to see if you are eating the right amount of calories, protein, carbs                With My Fitness Pal-->When you set-up the prema or need to adjust settings:                Goals should include:                 Lose 1.5-2 lbs per week                Activity level: not very active (can't count exercise towards calorie number per day)                   ** Daily INPUT> Look at nutrition section--  \"nutrients\" and it will break down your macros for the day (ie. Protein, carbs, fibers, sugars and fats). Try to stay within these numbers daily     2. \"7 minute workout\" to help with exercise/activity which takes 7 minutes of your day and that you can do at home!   3. \"Calm\" or \"Headspace\" which helps with mindfulness, meditation, clarity, sleep, and bijal to your daily life.   4. [employer] blog for healthy recipe ideas  5. [employer]here[employer] for low carb resources    HIGH PROTEIN SNACK IDEAS  -cottage cheese  -plain yogurt  -kefir  -hard-boiled eggs  -natural cheeses  -nuts (measure portion size)   -unsweetened nut butters  -dried edamame   -javier seeds soaked in water or almond milk  -soy nuts  -cured meats (monitor for sodium issues)   -hummus with vegetables  -bean dip with vegetables     FRUIT  Low carb fruit options   Raspberries: Half a cup (60 grams) contains 3 grams of carbs.  Blackberries: Half a cup (70 grams) contains 4 grams of carbs.  Strawberries: Half a cup (100 grams) contains 6 grams of carbs.  Blueberries: Half a cup (50 grams) contains 6 grams of carbs.  Plum: One medium-sized (80 grams) contains 6 grams of carbs.     VEGETABLES  Low carb vegetables              Delicious and Healthy Snacks     Tomato and cheese plate--mix 1/2 cup   cherry tomatoes, 1 cup fresh mini mozzarella   balls, ½ cup olives     Cottage cheese & berries--top ½ cup of   cottage cheese with 1 cup of berries of   choice. Add one handful of pecans for some   extra protein, fat and fiber.      Apple + Greek Yogurt + Nut butter*--cut up   an apple and dip in 1/3 cup of Greek yogurt   and 2 tbsp of nut butter.     Hummus & veggies--dip baby carrots, pepper   strips, or snap peas in ¼ cup hummus.     Nuts--munch on 1oz of any kind of nut (about   ¼ cup). In the shell will help to slow down!   Carrots and cheese plate-- 1 cup of baby   carrots (or veg of choice) with ½ cup cheddar   cheese cubes and 2-4 low sodium, nitrate   free  turkey slices     Yogurt & berries--mix ½-1 cup berries in a   6oz container of plain or low sugar fruit   flavored 2% Greek yogurt (less than15 grams   of sugar per serving). Chobani (Less Sugar) or   Siggis are examples.     Hard boiled egg & fruit--1 to 2 hardboiled   eggs and a tangerine or other small serving   of fruit     Tuna & avocado--put 2oz of tuna (one pouch)   on 1/3 of an avocado or 2 tablespoons   guacamole and top with salsa     String cheese & veggies--one piece cheese   (¾ oz) and 1 cup snap peas or other   vegetables     Cauliflower rice & cheese--sprinkle ¼ cup   shredded cheese on 1 cup cauliflower rice   and microwave until cheese melts     Deviled eggs--3 deviled egg halves     Bean dip & veggies- ½ cup refried beans   with ¼ cup shredded cheese melted on   top. Use as a dip for celery or red pepper   strips or eat plain.     S[place]ento Balanced Breaks-or make your   own with ½ oz nuts, ½ oz cheese, and 1   teaspoon dried fruit.     Edamame with fruit and nuts--1-2   mandarin orange, ¼ cup cashews, ¼ cup   edamame      Low fat chicken, egg, or tuna salad--mix   2oz chicken, tuna, or with 1 teaspoon   mayonnaise, 1 teaspoon Ismael mustard,   and 1 teaspoon plain yogurt. Add chopped   celery, onions, walnuts, Granny Smith   apples, or dried cranberries for extra flavor.     [place] break--turkey, chicken, or nut   butter on 1 slice whole grain bread.     Protein Shake--Hagan, Core Power, Orgain,   Premier Protein, Fairlife     Protein Bar--Quest, Oatmega, RX Bar, [place]   Bars     Protein Chips - Legendary, Deanna and Quest chips  These snacks contain protein,   fiber and fat to keep you full and   energized!   *If you have a peanut allergy, you can substitute with   Sun Butter (made from sunflower seeds) or WOW butter   (made from soy      No follow-ups on file.    Patient verbalizes understanding.    Maylin Pompa PA-C  6/2/2025         [1]   Current Outpatient Medications on File Prior to  Visit   Medication Sig Dispense Refill    valACYclovir 1 G Oral Tab Take 1 tablet (1,000 mg total) by mouth 2 (two) times daily.      aspirin 81 MG Oral Tab EC Take 1 tablet (81 mg total) by mouth daily.      XIIDRA 5 % Ophthalmic Solution       metoprolol succinate ER 50 MG Oral Tablet 24 Hr Take 1 tablet (50 mg total) by mouth daily.      Metaxalone 800 MG Oral Tab Take 0.5 tablets (400 mg total) by mouth 3 (three) times daily as needed for Pain. 30 tablet 0    lisinopril 2.5 MG Oral Tab Take 1 tablet (2.5 mg total) by mouth daily. 30 tablet 11    zinc sulfate 220 (50 Zn) MG Oral Cap Take 50 mg by mouth daily. 50 mg      Ascorbic Acid (VITAMIN C-ACEROLA) 500 MG Oral Tab Take 1 tablet (500 mg total) by mouth daily.      Cholecalciferol (VITAMIN D) 1000 UNITS Oral Tab Take 2 tablets by mouth daily.      B Complex Vitamins (VITAMIN B COMPLEX OR) Take by mouth. Daily      Na Sulfate-K Sulfate-Mg Sulf (SUPREP BOWEL PREP KIT) 17.5-3.13-1.6 GM/177ML Oral Solution Take as directed by physician. (Patient not taking: Reported on 6/2/2025) 1 kit 0     No current facility-administered medications on file prior to visit.

## 2025-06-02 NOTE — PATIENT INSTRUCTIONS
1200 calories a day    Moderate Carb (30/35/35)  85g  Protein    44g  Fats    99g  Carbs    Lower Carb (40/40/20)  114g  Protein    50g  Fats    57g  Carbs    We are here to support you with weight loss, but please remember that you still need your primary care provider for your routine health maintenance.      PLAN:  Begin Wegovy  Follow up with me in 4 months  Schedule follow up appointments: Renard Mosley (dietitian) or Debby Stone (presurgery dietitian)   Check for insurance coverage for dietitian and labwork prior to scheduling appointment.     Please try to work on the following dietary changes:  Goals: Aim for 20-30 grams of protein/ meal  Eat 4-6 vegetables/day  Avoid skipping meals- eat every 4-5 hours  Aim for 3 meals/day  2. Drink lots of water and cut down on soda/juice consumption if soda/juice drinker  3. Focus on protein: (15-30 grams with each meal) ie. greek yogurt, cottage cheese, string cheese, hard boiled eggs  4. Healthy snacks: peanut butter and apples, hummus and carrots, berries, nuts (1/4 cup), tuna and crackers                 Protein Shakes: Premier protein or Core Power                Protein Bars: Rx Bars, Oatmega, Power Crunch                 S[place] balanced breaks (cheese and nuts)- without chocolate  5. Reduce carbohydrates which includes sweets as well as rice, pasta, potatoes, bread, corn and instead choose whole grain options or more protein or vegetables (4-6 servings of vegetables per day)  6. Get a good night of sleep  7. Try to decrease stress in life     Please download apps:  1. My Fitness Pal, Lose it, My Net Diary prema to help you to monitor daily dietary intake and you will be able to see if you are eating the right amount of calories, protein, carbs                With My Fitness Pal-->When you set-up the prema or need to adjust settings:                Goals should include:                 Lose 1.5-2 lbs per week                Activity level: not very active (can't count  exercise towards calorie number per day)                   ** Daily INPUT> Look at nutrition section-- \"nutrients\" and it will break down your macros for the day (ie. Protein, carbs, fibers, sugars and fats). Try to stay within these numbers daily     2. \"7 minute workout\" to help with exercise/activity which takes 7 minutes of your day and that you can do at home!   3. \"Calm\" or \"Headspace\" which helps with mindfulness, meditation, clarity, sleep, and bijal to your daily life.   4. [employer] blog for healthy recipe ideas  5. [employer] for low carb resources    HIGH PROTEIN SNACK IDEAS  -cottage cheese  -plain yogurt  -kefir  -hard-boiled eggs  -natural cheeses  -nuts (measure portion size)   -unsweetened nut butters  -dried edamame   -javier seeds soaked in water or almond milk  -soy nuts  -cured meats (monitor for sodium issues)   -hummus with vegetables  -bean dip with vegetables     FRUIT  Low carb fruit options   Raspberries: Half a cup (60 grams) contains 3 grams of carbs.  Blackberries: Half a cup (70 grams) contains 4 grams of carbs.  Strawberries: Half a cup (100 grams) contains 6 grams of carbs.  Blueberries: Half a cup (50 grams) contains 6 grams of carbs.  Plum: One medium-sized (80 grams) contains 6 grams of carbs.     VEGETABLES  Low carb vegetables              Delicious and Healthy Snacks     Tomato and cheese plate--mix 1/2 cup   cherry tomatoes, 1 cup fresh mini mozzarella   balls, ½ cup olives     Cottage cheese & berries--top ½ cup of   cottage cheese with 1 cup of berries of   choice. Add one handful of pecans for some   extra protein, fat and fiber.      Apple + Greek Yogurt + Nut butter*--cut up   an apple and dip in 1/3 cup of Greek yogurt   and 2 tbsp of nut butter.     Hummus & veggies--dip baby carrots, pepper   strips, or snap peas in ¼ cup hummus.     Nuts--munch on 1oz of any kind of nut (about   ¼ cup). In the shell will help to slow down!   Carrots and cheese plate-- 1 cup of baby    carrots (or veg of choice) with ½ cup cheddar   cheese cubes and 2-4 low sodium, nitrate   free turkey slices     Yogurt & berries--mix ½-1 cup berries in a   6oz container of plain or low sugar fruit   flavored 2% Greek yogurt (less than15 grams   of sugar per serving). Chobani (Less Sugar) or   Siggis are examples.     Hard boiled egg & fruit--1 to 2 hardboiled   eggs and a tangerine or other small serving   of fruit     Tuna & avocado--put 2oz of tuna (one pouch)   on 1/3 of an avocado or 2 tablespoons   guacamole and top with salsa     String cheese & veggies--one piece cheese   (¾ oz) and 1 cup snap peas or other   vegetables     Cauliflower rice & cheese--sprinkle ¼ cup   shredded cheese on 1 cup cauliflower rice   and microwave until cheese melts     Deviled eggs--3 deviled egg halves     Bean dip & veggies- ½ cup refried beans   with ¼ cup shredded cheese melted on   top. Use as a dip for celery or red pepper   strips or eat plain.     S[place] Balanced Breaks-or make your   own with ½ oz nuts, ½ oz cheese, and 1   teaspoon dried fruit.     Edamame with fruit and nuts--1-2   mandarin orange, ¼ cup cashews, ¼ cup   edamame      Low fat chicken, egg, or tuna salad--mix   2oz chicken, tuna, or with 1 teaspoon   mayonnaise, 1 teaspoon Ismael mustard,   and 1 teaspoon plain yogurt. Add chopped   celery, onions, walnuts, Granny Smith   apples, or dried cranberries for extra flavor.     [place] break--turkey, chicken, or nut   butter on 1 slice whole grain bread.     Protein Shake--Hagan, Core Power, Orgain,   Premier Protein, Fairlife     Protein Bar--Quest, Oatmega, RX Bar, [place]   Bars     Protein Chips - Legendary, Deanna and Quest chips  These snacks contain protein,   fiber and fat to keep you full and   energized!   *If you have a peanut allergy, you can substitute with   Sun Butter (made from sunflower seeds) or WOW butter   (made from soy

## 2025-06-03 ENCOUNTER — TELEPHONE (OUTPATIENT)
Dept: INTERNAL MEDICINE CLINIC | Facility: CLINIC | Age: 65
End: 2025-06-03

## 2025-06-03 NOTE — TELEPHONE ENCOUNTER
Product not covered by this plan. Prior Authorization not available.  Payer: [employer] [facility] Case ID: [id number]    [phone]  Note from payer: Prior Authorization Not Available - This product is excluded from this benefit plan. - Prescriber details have been updated to match the prescriber directory.

## 2025-06-03 NOTE — TELEPHONE ENCOUNTER
Prior auth needs to be obtained for [employer]  Will try to initiate in epic  If not will proceed on [facility]  Keycode: [id number]

## 2025-06-09 ENCOUNTER — TELEPHONE (OUTPATIENT)
Facility: CLINIC | Age: 65
End: 2025-06-09

## 2025-06-09 NOTE — TELEPHONE ENCOUNTER
Pt said insurance doesn't cover injection and would like Maylin to send other prescription they discussed. Pt would like a msg back.

## 2025-06-26 ENCOUNTER — TELEPHONE (OUTPATIENT)
Dept: ORTHOPEDICS CLINIC | Facility: CLINIC | Age: 65
End: 2025-06-26

## 2025-06-26 DIAGNOSIS — M25.531 RIGHT WRIST PAIN: Primary | ICD-10-CM

## 2025-06-26 NOTE — TELEPHONE ENCOUNTER
Xray ordered per Ortho protocol, Xray scheduled.  [employer] message sent to patient to arrive 15 - 20 min early to complete imaging.

## 2025-06-27 ENCOUNTER — HOSPITAL ENCOUNTER (OUTPATIENT)
Dept: GENERAL RADIOLOGY | Age: 65
Discharge: HOME | End: 2025-06-27
Attending: FAMILY MEDICINE
Payer: COMMERCIAL

## 2025-06-27 ENCOUNTER — OFFICE VISIT (OUTPATIENT)
Facility: CLINIC | Age: 65
End: 2025-06-27
Payer: COMMERCIAL

## 2025-06-27 VITALS — BODY MASS INDEX: 29.82 KG/M2 | HEIGHT: 65 IN | WEIGHT: 179 LBS

## 2025-06-27 DIAGNOSIS — M67.439 DORSAL WRIST GANGLION: Primary | ICD-10-CM

## 2025-06-27 DIAGNOSIS — M25.531 RIGHT WRIST PAIN: ICD-10-CM

## 2025-06-27 PROCEDURE — 3008F BODY MASS INDEX DOCD: CPT | Performed by: FAMILY MEDICINE

## 2025-06-27 PROCEDURE — 73110 X-RAY EXAM OF WRIST: CPT | Performed by: FAMILY MEDICINE

## 2025-06-27 PROCEDURE — 99214 OFFICE O/P EST MOD 30 MIN: CPT | Performed by: FAMILY MEDICINE

## 2025-06-27 NOTE — H&P
Sports Medicine Clinic Note    Subjective:    Chief Complaint: Right wrist pain and swelling    History: Patient reports intermittent right wrist pain and swelling over the past several months, localized to an area where a soft, painful bump appears. The bump is described as resembling a ganglion cyst and fluctuates in size. The pain radiates at times and has affected  strength, occasionally causing the patient to drop objects. Symptoms are aggravated by computer use, particularly with mouse handling. Patient denies any history of trauma or falls. 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    Objective:    Right Wrist Examination:    Inspection: No erythema or deformity. No visible mass at time of exam. No swelling noted.  Palpation: Focal tenderness over the dorsal aspect of the wrist where patient localizes symptoms. No discrete mass appreciated on palpation today.  Range of Motion: Full flexion, extension, radial and ulnar deviation without restriction though discomfort reported at extremes.  Neurovascular: Sensation intact to light touch in median, ulnar, and radial nerve distributions. Capillary refill <2 seconds. No intrinsic muscle atrophy noted.  Special Tests: Negative Finkelstein and Phalen tests. Tinel’s sign negative at the wrist.  strength mildly decreased on affected side due to pain.    Diagnostic Tests:    Radiographs of the right wrist demonstrated no acute fracture or dislocation. Joint spaces and bony alignment were maintained. No radiopaque foreign body identified.    Assessment:    Suspected right dorsal wrist ganglion cyst likely related to repetitive strain.  Intermittent mechanical symptoms possibly exacerbated by occupational activities.    Plan:    Additional Workup: Consider ultrasound if mass becomes more prominent and symptomatic to better characterize the lesion.  Therapy: Occupational therapy may be considered if symptoms  worsen, focusing on ergonomic education and wrist stabilization strategies.  Medications: NSAIDs as needed for pain control.  Bracing/Casting: Consider short-term wrist brace during symptomatic flares to limit aggravating movements.  Procedures: Aspiration or surgical excision could be considered if symptoms persist or worsen.  Activity Recommendations: Avoid repetitive wrist movements and implement ergonomic modifications to computer workstation.    Follow-Up: As needed if the mass enlarges or symptoms significantly worsen.      Gaetano Chaparro DO, CAQSM   Primary Care Sports Medicine

## 2025-07-08 ENCOUNTER — PATIENT MESSAGE (OUTPATIENT)
Facility: CLINIC | Age: 65
End: 2025-07-08

## 2025-07-09 NOTE — TELEPHONE ENCOUNTER
Patient complains that metformin is not helping  6/2/25 179#  now 177#    Future Appointments   Date Time Provider Department Center   10/2/2025 12:00 PM Maylin Pompa, JOSE M [facility] [address]   10/3/2025  8:15 AM Yudelka Madera MD [facility]   10/3/2025  8:30 AM Yudelka Madera MD [facility]   12/10/2025 10:15 AM Pratik Meraz MD [facility]

## 2025-07-13 NOTE — TELEPHONE ENCOUNTER
We could increase the dose of metformin    We could try another medication like topamax - used for migraines, works on receptors on the brain, curbs cravings for carbs and sweets

## 2025-08-06 ENCOUNTER — TELEPHONE (OUTPATIENT)
Dept: INTERNAL MEDICINE CLINIC | Facility: CLINIC | Age: 65
End: 2025-08-06

## (undated) NOTE — LETTER
Patient Name: Ashly De Dios        : 1960       Medical Record #: [id number]    CONSENT FOR PROCEDURES/SEDATION    Date: 10/28/2024       Time: 2:58 PM        1. I authorize the performance upon Ashly De Dios the following:    Left shoulder with guided ultrasound injection  __________________________________________________________________________    2. I authorize Dr. Chaparro (and whomever is designated as the doctor’s assistant), to perform the above mentioned procedures.    3. If any unforeseen conditions arise during this procedure calling for additional procedures, operations, or medications (including anesthesia and blood transfusion), I  further request and authorize the doctor to do whatever he/she deems advisable in my interest.    4. I consent to the taking and reproduction of any photographs in the course of this procedure for professional purposes.    5. I consent to the administration of such sedation as may be considered necessary or advisable by the physician responsible for this service, with the exception of  _____________________________.    6. I have been informed by my doctor of the nature and purpose of this procedure/sedation, possible alternative methods of treatment, risk involved and possible complications.      Signature of Patient:___________________________  Date: 10/28/24    Signature of person authorized to consent for patient: Relationship to patient:  ___________________________    ___________________    Witness: ____________________     Date: 10/28/24    Provider: ____________________     Date: 10/28/24

## (undated) NOTE — LETTER
[facility] [address], [address]  Consent for Procedure/Sedation    Date: 10/30/21   Time: 08:30      1.  I authorize the performance upon David Campbell the following:cardiac catheterization, left ventricular cineangiography, bilate ____________________________________________________    Signature of person authorized                                     Relationship to  to consent for patient: _________________________ patient: ___________________    Witness: _________________________

## (undated) NOTE — LETTER
[facility], [address] [place] [address]  [address] [facility] [address]  [facility] [address]  Ph:[phone]  Fax:[phone]        Patient Information:  Patient Name:   Address: Ashly De Dios, ([id number])  [address] [facility] [address] [phone] (home)  Sex: female          : 1960   ________________________________________________________________  Referral Information:  Order Date: 2024       Epic Order #: [id number]   Referral Type: Physical Therapy Referral - [facility] Dx: Chronic right-sided low back pain without sciatica (M54.50,G89.29)   Signed Referral [place]:     Physical Therapy Area of Concentration: Ortho  Physical Therapy Ortho: General     Number of Visits: 8     Unless otherwise indicated by your provider or insurance, feel free to schedule your appointment with the first available provider in the specialist group you were referred to that meets your scheduling needs if the referred to provider is not available.           ______________________________________________________________  Scheduling Information:  **THIS IS NOT A REFERRAL**  Your physician has recommended you to [facility].  If your insurance requires you to obtain a managed care referral,   please allow 3 working days from the date of this order for the   referral to be processed.  Please wait 3 working days to schedule   your appointment unless notified.  To schedule at any of the follow locations, please  call: [phone]            [facility] and                Ctr in [place]                   F[facility] Ctr                       [address]                   [facility]                       [address]                             [address]            [place] [address]            [place] [address]                   [facility]                Ctr in [facility]  in [place]           [address]                 [address]                                [place] [address]               [place] [address]                                [facility]                Ctr in [place]                    Ctr in [place]                  [address]               [address] [place] [address]              [place]  [address]                                    [facility]        [facility]  [facility] [place]  [address]        [address] [place] [address]             [place], [address]                       _______________________________________________________         Referred to Department Information      Department Address City Phone      PHYSICAL THERAPY [address] [place] [phone]          Coverage Information:      Active Insurance as of 7/2/2024              Primary Coverage               Payor Plan Insurance Group Employer/Plan Group      [facility] OUT OF STATE [id number]                Payor Address Payor Phone Number Payor Fax Number Effective Dates      [address]     9/1/2014 - None Entered      [facility] [address]                    Subscriber Name Subscriber Birth Date Member ID           [id number] RIVASNE 5/2/1960 [id number]                Guarantor Name (ID) Guarantor Birth Date Guarantor Address Guarantor Type      TERE OLVERA ([id number]) 5/2/1960 [address] [facility] Personal/Family          [facility] [address]                    Electronically Signed By: Anika Benjamin DO [NPI: [id number]]  Order Date: Jul 2, 2024 at 5:09 PM

## (undated) NOTE — LETTER
Patient Name: Ashly De Dios        : 1960       Medical Record #: [id number]    CONSENT FOR PROCEDURES/SEDATION    Date: 11/15/2024       Time: 10:54 AM        1. I authorize the performance upon Ashly De Dios the following:    Left shoulder injections   __________________________________________________________________________    2. I authorize Dr. Chaparro (and whomever is designated as the doctor’s assistant), to perform the above mentioned procedures.    3. If any unforeseen conditions arise during this procedure calling for additional procedures, operations, or medications (including anesthesia and blood transfusion), I  further request and authorize the doctor to do whatever he/she deems advisable in my interest.    4. I consent to the taking and reproduction of any photographs in the course of this procedure for professional purposes.    5. I consent to the administration of such sedation as may be considered necessary or advisable by the physician responsible for this service, with the exception of  _____________________________.    6. I have been informed by my doctor of the nature and purpose of this procedure/sedation, possible alternative methods of treatment, risk involved and possible complications.      Signature of Patient:___________________________  Date: 11/15/24    Signature of person authorized to consent for patient: Relationship to patient:  ___________________________    ___________________    Witness: ____________________     Date: 11/15/24    Provider: ____________________     Date: 11/15/24